# Patient Record
Sex: FEMALE | Race: BLACK OR AFRICAN AMERICAN | Employment: FULL TIME | URBAN - METROPOLITAN AREA
[De-identification: names, ages, dates, MRNs, and addresses within clinical notes are randomized per-mention and may not be internally consistent; named-entity substitution may affect disease eponyms.]

---

## 2017-02-26 ENCOUNTER — APPOINTMENT (OUTPATIENT)
Dept: GENERAL RADIOLOGY | Age: 57
End: 2017-02-26
Attending: EMERGENCY MEDICINE
Payer: COMMERCIAL

## 2017-02-26 ENCOUNTER — HOSPITAL ENCOUNTER (EMERGENCY)
Age: 57
Discharge: HOME | End: 2017-02-26
Attending: EMERGENCY MEDICINE | Admitting: EMERGENCY MEDICINE
Payer: COMMERCIAL

## 2017-02-26 VITALS
HEIGHT: 62 IN | SYSTOLIC BLOOD PRESSURE: 172 MMHG | BODY MASS INDEX: 45.19 KG/M2 | TEMPERATURE: 98.2 F | RESPIRATION RATE: 18 BRPM | OXYGEN SATURATION: 100 % | HEART RATE: 62 BPM | WEIGHT: 245.59 LBS | DIASTOLIC BLOOD PRESSURE: 92 MMHG

## 2017-02-26 DIAGNOSIS — R07.89 CHEST WALL PAIN: Primary | ICD-10-CM

## 2017-02-26 LAB
ALBUMIN SERPL BCP-MCNC: 3.6 G/DL (ref 3.5–5)
ALBUMIN/GLOB SERPL: 0.9 {RATIO} (ref 1.1–2.2)
ALP SERPL-CCNC: 80 U/L (ref 45–117)
ALT SERPL-CCNC: 83 U/L (ref 12–78)
ANION GAP BLD CALC-SCNC: 9 MMOL/L (ref 5–15)
AST SERPL W P-5'-P-CCNC: 71 U/L (ref 15–37)
BASOPHILS # BLD AUTO: 0.1 K/UL (ref 0–0.1)
BASOPHILS # BLD: 1 % (ref 0–1)
BILIRUB SERPL-MCNC: 0.8 MG/DL (ref 0.2–1)
BUN SERPL-MCNC: 11 MG/DL (ref 6–20)
BUN/CREAT SERPL: 18 (ref 12–20)
CALCIUM SERPL-MCNC: 8.8 MG/DL (ref 8.5–10.1)
CHLORIDE SERPL-SCNC: 107 MMOL/L (ref 97–108)
CK SERPL-CCNC: 72 U/L (ref 26–192)
CO2 SERPL-SCNC: 27 MMOL/L (ref 21–32)
CREAT SERPL-MCNC: 0.61 MG/DL (ref 0.55–1.02)
EOSINOPHIL # BLD: 0.2 K/UL (ref 0–0.4)
EOSINOPHIL NFR BLD: 4 % (ref 0–7)
ERYTHROCYTE [DISTWIDTH] IN BLOOD BY AUTOMATED COUNT: 13.5 % (ref 11.5–14.5)
GLOBULIN SER CALC-MCNC: 4.1 G/DL (ref 2–4)
GLUCOSE SERPL-MCNC: 83 MG/DL (ref 65–100)
HCT VFR BLD AUTO: 41.7 % (ref 35–47)
HGB BLD-MCNC: 13.8 G/DL (ref 11.5–16)
LYMPHOCYTES # BLD AUTO: 43 % (ref 12–49)
LYMPHOCYTES # BLD: 2.1 K/UL (ref 0.8–3.5)
MCH RBC QN AUTO: 30.3 PG (ref 26–34)
MCHC RBC AUTO-ENTMCNC: 33.1 G/DL (ref 30–36.5)
MCV RBC AUTO: 91.6 FL (ref 80–99)
MONOCYTES # BLD: 0.5 K/UL (ref 0–1)
MONOCYTES NFR BLD AUTO: 10 % (ref 5–13)
NEUTS SEG # BLD: 2 K/UL (ref 1.8–8)
NEUTS SEG NFR BLD AUTO: 42 % (ref 32–75)
PLATELET # BLD AUTO: 216 K/UL (ref 150–400)
POTASSIUM SERPL-SCNC: 3.3 MMOL/L (ref 3.5–5.1)
PROT SERPL-MCNC: 7.7 G/DL (ref 6.4–8.2)
RBC # BLD AUTO: 4.55 M/UL (ref 3.8–5.2)
SODIUM SERPL-SCNC: 143 MMOL/L (ref 136–145)
TROPONIN I SERPL-MCNC: <0.04 NG/ML
WBC # BLD AUTO: 4.8 K/UL (ref 3.6–11)

## 2017-02-26 PROCEDURE — 93005 ELECTROCARDIOGRAM TRACING: CPT

## 2017-02-26 PROCEDURE — 71020 XR CHEST PA LAT: CPT

## 2017-02-26 PROCEDURE — 80053 COMPREHEN METABOLIC PANEL: CPT | Performed by: EMERGENCY MEDICINE

## 2017-02-26 PROCEDURE — 85025 COMPLETE CBC W/AUTO DIFF WBC: CPT | Performed by: EMERGENCY MEDICINE

## 2017-02-26 PROCEDURE — 82550 ASSAY OF CK (CPK): CPT | Performed by: EMERGENCY MEDICINE

## 2017-02-26 PROCEDURE — 36415 COLL VENOUS BLD VENIPUNCTURE: CPT | Performed by: EMERGENCY MEDICINE

## 2017-02-26 PROCEDURE — 99283 EMERGENCY DEPT VISIT LOW MDM: CPT

## 2017-02-26 PROCEDURE — 84484 ASSAY OF TROPONIN QUANT: CPT | Performed by: EMERGENCY MEDICINE

## 2017-02-26 RX ORDER — GUAIFENESIN 100 MG/5ML
81 LIQUID (ML) ORAL DAILY
COMMUNITY

## 2017-02-26 RX ORDER — CHLORTHALIDONE 50 MG/1
50 TABLET ORAL DAILY
COMMUNITY

## 2017-02-26 RX ORDER — ESOMEPRAZOLE MAGNESIUM 40 MG/1
40 CAPSULE, DELAYED RELEASE ORAL DAILY
COMMUNITY

## 2017-02-26 RX ORDER — OXYBUTYNIN CHLORIDE 15 MG/1
30 TABLET, EXTENDED RELEASE ORAL DAILY
COMMUNITY

## 2017-02-26 RX ORDER — ATORVASTATIN CALCIUM 40 MG/1
40 TABLET, FILM COATED ORAL DAILY
COMMUNITY

## 2017-02-26 RX ORDER — ACETAMINOPHEN 500 MG
2000 TABLET ORAL 2 TIMES DAILY
COMMUNITY

## 2017-02-26 RX ORDER — AMLODIPINE BESYLATE 10 MG/1
10 TABLET ORAL DAILY
COMMUNITY

## 2017-02-26 RX ORDER — CLONIDINE HYDROCHLORIDE 0.1 MG/1
0.1 TABLET ORAL 2 TIMES DAILY
COMMUNITY

## 2017-02-26 RX ORDER — POTASSIUM CHLORIDE 20 MEQ/1
20 TABLET, EXTENDED RELEASE ORAL 2 TIMES DAILY
COMMUNITY

## 2017-02-26 RX ORDER — LOSARTAN POTASSIUM 100 MG/1
100 TABLET ORAL DAILY
COMMUNITY

## 2017-02-26 RX ORDER — METOPROLOL SUCCINATE 50 MG/1
50 TABLET, EXTENDED RELEASE ORAL DAILY
COMMUNITY

## 2017-02-27 LAB
ATRIAL RATE: 57 BPM
CALCULATED P AXIS, ECG09: 16 DEGREES
CALCULATED R AXIS, ECG10: 73 DEGREES
CALCULATED T AXIS, ECG11: 60 DEGREES
DIAGNOSIS, 93000: NORMAL
P-R INTERVAL, ECG05: 168 MS
Q-T INTERVAL, ECG07: 432 MS
QRS DURATION, ECG06: 84 MS
QTC CALCULATION (BEZET), ECG08: 420 MS
VENTRICULAR RATE, ECG03: 57 BPM

## 2017-02-27 NOTE — ED NOTES
Pt discharged with written instructions at this time. Pt verbalizes understanding and all questions were answered. Discharged by [facility], in stable condition, with . Wheelchair declined.

## 2017-02-27 NOTE — DISCHARGE INSTRUCTIONS
Musculoskeletal Chest Pain: Care Instructions  Your Care Instructions  Chest pain is not always a sign that something is wrong with your heart or that you have another serious problem. The doctor thinks your chest pain is caused by strained muscles or ligaments, inflamed chest cartilage, or another problem in your chest, rather than by your heart. You may need more tests to find the cause of your chest pain. Follow-up care is a key part of your treatment and safety. Be sure to make and go to all appointments, and call your doctor if you are having problems. Its also a good idea to know your test results and keep a list of the medicines you take. How can you care for yourself at home? · Take pain medicines exactly as directed. ¨ If the doctor gave you a prescription medicine for pain, take it as prescribed. ¨ If you are not taking a prescription pain medicine, ask your doctor if you can take an over-the-counter medicine. · Rest and protect the sore area. · Stop, change, or take a break from any activity that may be causing your pain or soreness. · Put ice or a cold pack on the sore area for 10 to 20 minutes at a time. Try to do this every 1 to 2 hours for the next 3 days (when you are awake) or until the swelling goes down. Put a thin cloth between the ice and your skin. · After 2 or 3 days, apply a heating pad set on low or a warm cloth to the area that hurts. Some doctors suggest that you go back and forth between hot and cold. · Do not wrap or tape your ribs for support. This may cause you to take smaller breaths, which could increase your risk of lung problems. · Mentholated creams such as Bengay or Icy Hot may soothe sore muscles. Follow the instructions on the package. · Follow your doctor's instructions for exercising. · Gentle stretching and massage may help you get better faster. Stretch slowly to the point just before pain begins, and hold the stretch for at least 15 to 30 seconds.  Do this 3 or 4 times a day. Stretch just after you have applied heat. · As your pain gets better, slowly return to your normal activities. Any increased pain may be a sign that you need to rest a while longer. When should you call for help? Call 911 anytime you think you may need emergency care. For example, call if:  · You have chest pain or pressure. This may occur with:  ¨ Sweating. ¨ Shortness of breath. ¨ Nausea or vomiting. ¨ Pain that spreads from the chest to the neck, jaw, or one or both shoulders or arms. ¨ Dizziness or lightheadedness. ¨ A fast or uneven pulse. After calling 911, chew 1 adult-strength aspirin. Wait for an ambulance. Do not try to drive yourself. · You have sudden chest pain and shortness of breath, or you cough up blood. Call your doctor now or seek immediate medical care if:  · You have any trouble breathing. · Your chest pain gets worse. · Your chest pain occurs consistently with exercise and is relieved by rest.  Watch closely for changes in your health, and be sure to contact your doctor if:  · Your chest pain does not get better after 1 week. Where can you learn more? Go to http://she-jennifer.info/. Enter V293 in the search box to learn more about \"Musculoskeletal Chest Pain: Care Instructions. \"  Current as of: May 27, 2016  Content Version: 11.1  © 2138-2034 [employer]. Care instructions adapted under license by [employer] (which disclaims liability or warranty for this information). If you have questions about a medical condition or this instruction, always ask your healthcare professional. [place] [address] any warranty or liability for your use of this information.

## 2017-02-27 NOTE — ED PROVIDER NOTES
HPI Comments: Rajesh Solorio is a 61 y.o. female who presents ambulatory to ED [facility] ED with CC of intermittent left sided CP x ~4 days, becoming constant since last night (2/25/17). She reports her pain intermittently radiates to her neck and back, and is exacerbated by laying on her left side. Pt reports a similar episode \"weeks ago,\" which she states resolved spontaneously. She notes family hx of cardiac disease, but denies personal hx. Pt denies trauma, injury, or heavy lifting to which her symptoms might be attributed. She specifically denies cough, sneezing, SOB, and N/V/D. She denies tobacco use. PCP: Vipul Martínez MD    There are no other complaints, changes, or physical findings at this time. The history is provided by the patient. Past Medical History:   Diagnosis Date    Arthritis     Hypertension     Ill-defined condition     bilateral LE lymphedema       Past Surgical History:   Procedure Laterality Date    HX APPENDECTOMY      HX GYN      endometrial ablasion    HX UROLOGICAL      bladder sling    JADEN BIOPSY BREAST STEREOTACTIC Right     BENIGN         History reviewed. No pertinent family history. Social History     Social History    Marital status:      Spouse name: N/A    Number of children: N/A    Years of education: N/A     Occupational History    Not on file. Social History Main Topics    Smoking status: Never Smoker    Smokeless tobacco: Not on file    Alcohol use No    Drug use: No    Sexual activity: Not on file     Other Topics Concern    Not on file     Social History Narrative         ALLERGIES: Shellfish derived; Diclofenac; Mefoxin [cefoxitin]; and Tramadol    Review of Systems   Constitutional: Negative for appetite change, chills and fever. HENT: Negative for congestion and sneezing. Eyes: Negative for visual disturbance. Respiratory: Negative for cough, shortness of breath and wheezing. Cardiovascular: Positive for chest pain (left). Negative for palpitations and leg swelling. Gastrointestinal: Negative for abdominal pain, diarrhea, nausea and vomiting. Genitourinary: Negative for dysuria, frequency and urgency. Musculoskeletal: Negative for back pain, joint swelling, myalgias and neck stiffness. Skin: Negative for rash. Neurological: Negative for dizziness, syncope, weakness and headaches. Hematological: Negative for adenopathy. Psychiatric/Behavioral: Negative for behavioral problems and dysphoric mood. Patient Vitals for the past 12 hrs:   Temp Pulse Resp BP SpO2   02/26/17 1617 98.2 °F (36.8 °C) 62 18 (!) 172/92 100 %            Physical Exam   Constitutional: She is oriented to person, place, and time. She appears well-developed and well-nourished. No distress. HENT:   Head: Normocephalic and atraumatic. Mouth/Throat: Oropharynx is clear and moist.   Eyes: Conjunctivae and EOM are normal. Pupils are equal, round, and reactive to light. No scleral icterus. Neck: Normal range of motion. Neck supple. Cardiovascular: Normal rate and regular rhythm. Exam reveals no gallop. No murmur heard. Pulmonary/Chest: Effort normal. No stridor. No respiratory distress. She has no wheezes. She has no rales. Abdominal: Soft. Bowel sounds are normal. She exhibits no distension and no mass. There is no tenderness. There is no rebound and no guarding. Musculoskeletal: Normal range of motion. She exhibits no edema. Lymphadenopathy:     She has no cervical adenopathy. Neurological: She is alert and oriented to person, place, and time. No cranial nerve deficit. Coordination normal.   Skin: Skin is warm and dry. No rash noted. No erythema. Psychiatric: She has a normal mood and affect. Nursing note and vitals reviewed.        MDM  Number of Diagnoses or Management Options  Chest wall pain:   Diagnosis management comments: DDx: chest wall pain, atypical ACS, GERD       Amount and/or Complexity of Data Reviewed  Clinical lab tests: reviewed and ordered  Tests in the radiology section of CPT®: ordered and reviewed  Tests in the medicine section of CPT®: ordered and reviewed  Review and summarize past medical records: yes  Independent visualization of images, tracings, or specimens: yes      ED Course       Procedures    EKG interpretation: (Preliminary)  Rhythm: sinus bradycardia; and regular . Rate (approx.): 57; Axis: normal; P wave: normal; QRS interval: normal ; ST/T wave: normal; Other findings: normal.    8:05 PM  Unremarkable workup. Pt's sx are most C/W chest wall pain. Will treat with NSAID, refer to PMD for follow up. Iman Vann MD    LABORATORY TESTS:  Recent Results (from the past 12 hour(s))   EKG, 12 LEAD, INITIAL    Collection Time: 02/26/17  4:19 PM   Result Value Ref Range    Ventricular Rate 57 BPM    Atrial Rate 57 BPM    P-R Interval 168 ms    QRS Duration 84 ms    Q-T Interval 432 ms    QTC Calculation (Bezet) 420 ms    Calculated P Axis 16 degrees    Calculated R Axis 73 degrees    Calculated T Axis 60 degrees    Diagnosis       Sinus bradycardia  Otherwise normal ECG  No previous ECGs available     METABOLIC PANEL, COMPREHENSIVE    Collection Time: 02/26/17  4:43 PM   Result Value Ref Range    Sodium 143 136 - 145 mmol/L    Potassium 3.3 (L) 3.5 - 5.1 mmol/L    Chloride 107 97 - 108 mmol/L    CO2 27 21 - 32 mmol/L    Anion gap 9 5 - 15 mmol/L    Glucose 83 65 - 100 mg/dL    BUN 11 6 - 20 MG/DL    Creatinine 0.61 0.55 - 1.02 MG/DL    BUN/Creatinine ratio 18 12 - 20      GFR est AA >60 >60 ml/min/1.73m2    GFR est non-AA >60 >60 ml/min/1.73m2    Calcium 8.8 8.5 - 10.1 MG/DL    Bilirubin, total 0.8 0.2 - 1.0 MG/DL    ALT (SGPT) 83 (H) 12 - 78 U/L    AST (SGOT) 71 (H) 15 - 37 U/L    Alk.  phosphatase 80 45 - 117 U/L    Protein, total 7.7 6.4 - 8.2 g/dL    Albumin 3.6 3.5 - 5.0 g/dL    Globulin 4.1 (H) 2.0 - 4.0 g/dL    A-G Ratio 0.9 (L) 1.1 - 2.2     CBC WITH AUTOMATED DIFF    Collection Time: 02/26/17  4:43 PM Result Value Ref Range    WBC 4.8 3.6 - 11.0 K/uL    RBC 4.55 3.80 - 5.20 M/uL    HGB 13.8 11.5 - 16.0 g/dL    HCT 41.7 35.0 - 47.0 %    MCV 91.6 80.0 - 99.0 FL    MCH 30.3 26.0 - 34.0 PG    MCHC 33.1 30.0 - 36.5 g/dL    RDW 13.5 11.5 - 14.5 %    PLATELET 253 733 - 289 K/uL    NEUTROPHILS 42 32 - 75 %    LYMPHOCYTES 43 12 - 49 %    MONOCYTES 10 5 - 13 %    EOSINOPHILS 4 0 - 7 %    BASOPHILS 1 0 - 1 %    ABS. NEUTROPHILS 2.0 1.8 - 8.0 K/UL    ABS. LYMPHOCYTES 2.1 0.8 - 3.5 K/UL    ABS. MONOCYTES 0.5 0.0 - 1.0 K/UL    ABS. EOSINOPHILS 0.2 0.0 - 0.4 K/UL    ABS. BASOPHILS 0.1 0.0 - 0.1 K/UL   TROPONIN I    Collection Time: 02/26/17  4:43 PM   Result Value Ref Range    Troponin-I, Qt. <0.04 <0.05 ng/mL   CK W/ REFLX CKMB    Collection Time: 02/26/17  4:43 PM   Result Value Ref Range    CK 72 26 - 192 U/L       IMAGING RESULTS:  CXR Results  (Last 48 hours)               02/26/17 1903  XR CHEST PA LAT Final result    Impression:   impression: Negative. Narrative:  Clinical indication: Chest pain. Frontal and lateral views of the chest obtained. The heart size is normal. There   is no acute infiltrate. IMPRESSION:  1. Chest wall pain        PLAN:  1. Discharge for follow up with PCP    Current Discharge Medication List      CONTINUE these medications which have NOT CHANGED    Details   potassium chloride (K-DUR, KLOR-CON) 20 mEq tablet Take 20 mEq by mouth two (2) times a day. metoprolol succinate (TOPROL-XL) 50 mg XL tablet Take 50 mg by mouth daily. chlorthalidone (HYGROTEN) 50 mg tablet Take 50 mg by mouth daily. losartan (COZAAR) 100 mg tablet Take 100 mg by mouth daily. atorvastatin (LIPITOR) 40 mg tablet Take 40 mg by mouth daily. oxybutynin chloride XL (DITROPAN XL) 15 mg CR tablet Take 30 mg by mouth daily. amLODIPine (NORVASC) 10 mg tablet Take 10 mg by mouth daily. cloNIDine HCl (CATAPRES) 0.1 mg tablet Take 0.1 mg by mouth two (2) times a day. aspirin 81 mg chewable tablet Take 81 mg by mouth daily. multivit-min-FA-lycopen-lutein (CENTRUM SILVER) 0.4-300-250 mg-mcg-mcg tab Take  by mouth. omega 3-dha-epa-fish oil (FISH OIL) 100-160-1,000 mg cap Take  by mouth. Cholecalciferol, Vitamin D3, (VITAMIN D3) 2,000 unit cap capsule Take 2,000 Units by mouth two (2) times a day. esomeprazole (NEXIUM) 40 mg capsule Take 40 mg by mouth daily. 2.   Follow-up Information     Follow up With Details Comments Contact Info    Amy Toribio MD  If symptoms worsen [address]  [phone]          Return to ED if worse     DISCHARGE NOTE:  8:19 PM  The patient is ready for discharge. The patients signs, symptoms, diagnosis, and instructions for discharge have been discussed and the pt has conveyed their understanding. The patient is to follow up as recommended with PCP or return to the ER should their symptoms worsen. Plan has been discussed and patient has conveyed their agreement. This note is prepared by Chip Logn, acting as Scribe for Vicki Toth MD.    Vicki Toth MD: The scribe's documentation has been prepared under my direction and personally reviewed by me in its entirety. I confirm that the note above accurately reflects all work, treatment, procedures, and medical decision making performed by me.

## 2017-02-27 NOTE — ED NOTES
Assumed care of pt at this time, received bedside report from [facility] with pt included in care. Pt is resting in room, with call bell in reach. All questions answered.

## 2017-04-04 ENCOUNTER — HOSPITAL ENCOUNTER (OUTPATIENT)
Dept: PHYSICAL THERAPY | Age: 57
Discharge: HOME | End: 2017-04-04
Payer: COMMERCIAL

## 2017-04-04 VITALS — HEIGHT: 62 IN | WEIGHT: 242.4 LBS | BODY MASS INDEX: 44.61 KG/M2

## 2017-04-04 PROCEDURE — 97140 MANUAL THERAPY 1/> REGIONS: CPT

## 2017-04-04 PROCEDURE — 97161 PT EVAL LOW COMPLEX 20 MIN: CPT

## 2017-04-04 NOTE — PROGRESS NOTES
[facility]  Physical Therapy Lymphedema Clinic  [address] [facility], [address] [place] [address], [facility] 22.    INITIAL EVALUATION    NAME: Jailyn Duke AGE: 61 y.o. GENDER: female  DATE: 4/4/2017  REFERRING PHYSICIAN: Dr. Brewer Beebe:   Primary Diagnosis:  · Primary lymphedema (Q82.0)  Other Treatment Diagnoses:  · Abnormality of gait (R26.9)  · Swelling not relieved by elevation (R60.9)  Date of Onset: Referred 3/2017. Patient reports swelling has been present for years since she was young, but she was first diagnosed 11/24/2015. Patient has numerous family members who present with similar swelling symptoms and this strong family history is indicative of primary lymphedema. Present Symptoms and Functional Limitations: Patient reports chronic swelling bilateral lower extremities with the right lower extremity being worse than the left. She hs received treatment at [facility] in the past with her last visit having been in June of 2016. She wears flat-knit custom Juzo compression knee highs and christ pants daily and uses full leg Circaid Juxtafits at night. Patient reports she has some issues with the Circaid Juxtafits staying up on her thighs at night and she has been having ongoing issues with the velcro rolling up on the garment which affects the compression applied. The garments wee replaced by the  as a result of this problem, but it occurred again on the replacement set. Additionally, she has had 2 episodes of increased pain in her left leg in popliteal region and proximal calf that was worse with her compression in place and improved when she removed the compression. She discontinued compression use until the pain resolved (several days) and then started wearing her products again, but the pain reoccurred and she removed the compression once again.   She has seen her physician about the pain issues and Dopplars were performed and were negative for DVT. Patient has resumed her day and night-time compression use and the pain has not reoccurred since she resumed daily compression use ( about a couple of weeks ago.)  Patient is seeking additional assistance with night compression and her home management routine. Lymphedema Life Impact Scale: Score of 28 and impairment percentage of 41. See scanned document. Past Medical History:   Past Medical History:   Diagnosis Date    Arthritis     Hypertension     Ill-defined condition     bilateral LE lymphedema     Past Surgical History:   Procedure Laterality Date    HX APPENDECTOMY      HX GYN      endometrial ablasion    HX UROLOGICAL      bladder sling    JADEN BIOPSY BREAST STEREOTACTIC Right     BENIGN     Current Medications:    Current Outpatient Prescriptions   Medication Sig    potassium chloride (K-DUR, KLOR-CON) 20 mEq tablet Take 20 mEq by mouth two (2) times a day.  metoprolol succinate (TOPROL-XL) 50 mg XL tablet Take 50 mg by mouth daily.  chlorthalidone (HYGROTEN) 50 mg tablet Take 50 mg by mouth daily.  losartan (COZAAR) 100 mg tablet Take 100 mg by mouth daily.  atorvastatin (LIPITOR) 40 mg tablet Take 40 mg by mouth daily.  oxybutynin chloride XL (DITROPAN XL) 15 mg CR tablet Take 30 mg by mouth daily.  amLODIPine (NORVASC) 10 mg tablet Take 10 mg by mouth daily.  cloNIDine HCl (CATAPRES) 0.1 mg tablet Take 0.1 mg by mouth two (2) times a day.  aspirin 81 mg chewable tablet Take 81 mg by mouth daily.  multivit-min-FA-lycopen-lutein (CENTRUM SILVER) 0.4-300-250 mg-mcg-mcg tab Take  by mouth.  omega 3-dha-epa-fish oil (FISH OIL) 100-160-1,000 mg cap Take  by mouth.  Cholecalciferol, Vitamin D3, (VITAMIN D3) 2,000 unit cap capsule Take 2,000 Units by mouth two (2) times a day.  esomeprazole (NEXIUM) 40 mg capsule Take 40 mg by mouth daily. No current facility-administered medications for this encounter. 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 She is  with 3 adult children   Living Situation: Lives with spouse     Trainable Caregiver?: Spouse, he currently assists her with compression garment use as needed   Self-care/ADLs: Independent    Mobility: Independent   Sleeping Arrangement:  Sleeps in bed, but she reports she does not sleep well at night    Adaptive Equipment Owned: Non noted by patient     Previous Therapy:  Patient has received Lymphedema treatment at [facility] lymphedema program in the past with her last appointment in June 2016. She is switching her care to [facility] secondary to it is in closer proximity to her house. Compression/Lymphedema Equipment:  Custom flat-knit Juzo compression knee highs bilateral lower extremities and christ pants. She ordered her most recent pair from [address] [place] [address] in November 2016. She also has full leg bilateral lower extremity Circaid Juxtafits for night use. She reports that Circaid replaced a set of her compression due to velcro issues, but she had the same problems occur with the second set. She also reports using body glue to assist with Juxtafit suspension on her thighs secondary to they were slipping down. She consistently performs skin care with Eucerin and lymphedema exercises regularly. SUBJECTIVE:  I really hope you can help see about finding compression for night use that will perform better. I would like to get my legs back to where they were before I had that pain problem. I really need to be able to wear my compression everyday.      Patients goals for therapy: \"To get the swelling down and keep them down. \"    OBJECTIVE DATA SUMMARY:   EXAMINATION/PRESENTATION/DECISION MAKING:   Pain:  Pain Scale 1: Numeric (0 - 10)     Pain Intensity 1: 4     Pain Location 1: Leg     Pain Orientation 1: Left, Right             Self-Care and ADLs:  Grooming: Independent Bathing: Independent   UB Dressing: Independent LB Dressing: Independent   Don/Doff Shoes/Socks: Independent Toileting: Independent       Skin and Tissue Assessment:  Dermal Status:  (x )  Intact ( )  Dry   ( )  Tenuous ( )  Flaky   ( )  Wound/lesion present ( )  Scars   ( )  Dermatitis    Texture/Consistency:  (x )  Boggy ( )  Pitting Edema   ( )  Brawny ( )  Combination   ( )  Fibrotic/Woody    Pigmentation/Color Change:  ( )  Normal ( )  Hemosiderin   ( )  Red ( )  Erythematous   (x )  Hyperpigmented- mild bilateral lower legs, patient reports this has improved significantly since treatment was initiated ( )  Hyperlipodermatosclerosis   Anomalies:  ( )  Lymphorrhea ( )  Vesicles   ( )  Petechiae ( )  Warty Vercusis   ( )  Bullae ( )  Papilloma   Circulatory:  ( )  GALEN ( )  Varicosities: ( X)  Pulses Dorsalis Pedis is palpable bilaterally ( x)  Vascular studies ruled out DVT in past 2/2017 and  12/1/2015, negative for underlying venous reflux   ( )  DVT History    Nails:  (x )  Normal  ( )  Fungus  Stemmers Sign: Positive bilaterally  Height:  Height: 5' 2\" (157.5 cm)  Weight:  Weight: 110 kg (242 lb 6.4 oz)   BMI:  BMI (calculated): 44.3  (36 or greater: adversely affecting lymphedema)  Patient's weight has decreased since last June  Wound/Ulcer:  N/A      Wound Pain:   N/A  Volumetric Measurements:   Right:  15,429.93 mL Left:  14,777.79 mL   % Difference: 4.41%    (See scanned graph)  Patient's right lower extremity was 13,819.81 ml and left lower extremity was 12,738.88 ml on 5/18/16.   This is an increase in swelling secondary to her weight has actually decreased since she was last treated in lymphedema therapy  Range of Motion: Grossly within functional limits bilaterally  Strength: Grossly within functional limits bilateral lower extremities as observed in clinic today  Sensation:  Intact    Mobility:   Bed/Chair Mobility:  Independent Transfers:  Independent   Sitting Balance:  Good Standing Balance: Good   Gait:  Independent without assistive device with wide base of support Wheelchair Mobility:  N/A   Endurance:  Good Stairs:  Not assessed       Safety:  Patient is alert and oriented:  Yes   Safety awareness:  Good   Fall Risk?:  Low risk, she does not report a history of falls   Patient given written fall prevention handout: Yes   Precautions:  Standard lymphedema precautions to include avoiding blood pressure readings, injections and IVs or other procedures/acts that could lead to broken skin on affected area, and avoiding excessive heat, resistive activity or altitude without compression garment     Based on the above components, the patient evaluation is determined to be of the following complexity level: LOW     Evaluation Time: 6941-7843  60 minutes    TREATMENT PROVIDED:   1. Treatment description:  Manual Therapy: Patient instructed in skin care principles and anatomy of the lymphatic system. Patient is performing skin care daily with Eucerin lotion. Reviewed in detail the signs and symptoms of cellulitis and how to respond to these signs by contacting her doctor should they occur. She verbalized understanding. Discussed compression garment options for night use as this seems to be where the biggest concern remains with regard to her self management compression garment options. Demonstrated foam based night compression garments with samples of Seth, Solaris, Tribute and Vance sleeves. Demonstrated product donning and doffing for patient.   Discussed that other options are available in the velcro style compression as patient's Circaid Juxtafits are not wearing well for patient and she is having issues with the wear of the velcro. Provided patient with information on the Flexitouch and Flexitouch ordering process and discussed how it can be used in the home to assist with self-management. Patient expressed interest in pursuing this product. Treatment time:  7188-2068 Minutes: 30    ASSESSMENT:   Dasha Schwartz is a 61 y.o. female who presents with bilateral lower extremity primary lymphedema. Patient has been treated at [facility] in the past and has compression products for home use including flat-knit Juzo knee highs and christ pants which fit well and currently do not need to be replaced. She has had some exacerbation of her swelling recently and reports some unexplained episodes of left leg pain that worsened with compression in place and resulted in her having to remove the compression. Dopplers were completed to rule out DVT. Additionally she is having some issues with her night compression products and may benefit form some different compression products from those she is currently using. She would also benefit from the addition of an advanced pneumatic compression device to her home program.   Her condition is complicated by arthritis and HTN. Patient will benefit from complete decongestive therapy (CDT) including manual lymphatic drainage (MLD) technique, short-stretch textile bandages/compression system to decongest limb, and kinesiotaping as appropriate. Patient will receive instruction in proper skin care to recognize signs/symptoms of and prevent infection, therapeutic exercise, and self-MLD for independent home program and restorative lymphatic performance. This care is medically necessary due to the infection risk with lymphedema and to improve functional activities. CDT is necessary to resolve swelling to allow patient to return to wearing normal clothes/footwear and prevent worsening of symptoms, such as venous stasis ulcerations, infections, or hospitalizations.   Patient will be independent with home program strategies to allow improved ADL ability and mobility and to allow patient to return to greatest functional independence. Rehabilitation potential is considered to be Good. Factors which may influence rehabilitation potential include leg pain. Patient will benefit from 8-10 physical therapy visits over 10-12 weeks to optimize improvement in these areas. PLAN OF CARE:   Recommendations and Planned Interventions:  Manual lymph drainage/complete decongestive therapy  Multi-layer compression bandaging (short-stretch)  Compression garment fitting/provision  Lymphedema therapeutic exercise  AROM/PROM/Strength/Coordination  Self-care training  Functional mobility training  Education in skin care and lymphedema precautions  Self-MLD education per home program  Self-bandaging education per home program  Caregiver education as needed  Wound care as needed     GOALS  Short term goals  Time frame: 5/3/17  1. Patient will demonstrate knowledge of signs/symptoms of infections/cellulitis and be independent in skin care to prevent cellulitis. 2.  Patient will demonstrate independence in lymphedema home program of therapeutic exercises to improve circulation and decongest limb to improve ADLs. 3.  Patient will be measured for new night compression garments to improve her night time self-management routine and promote optimal limb decongestion. 4. Patient will complete a Flexitouch trial and she will be educated in this product as a possible additional self-management tool. Long term goals  Time frame: 6/30/17  1. Pt will show improvement in Lymphedema Life Impact Scale by decreasing impairment percentage to under 35 and thus allow improvement in patient's quality of life. 2.  Patient will be independent with don/doff of compression system and use in order to prevent reaccumulation of fluid at discharge.   3.  Pt will be independent in self-MLD and show stable limb volumes showing decongestion and pt. ready for transition to independent restorative phase of lymphedema therapy. Patient has participated in goal setting and agrees to work toward plan of care. Patient was instructed to call if questions or concerns arise. Thank you for this referral.   CHANCE Espinosa, MOMO   Time Calculation: 85 mins    TREATMENT PLAN EFFECTIVE DATES:   4/4/2017 TO 6/30/17  I have read the above plan of care for Jailyn Duke. I certify the above prescribed services are required by this patient and are medically necessary.   The above plan of care has been developed in conjunction with the lymphedema/physical therapist.       Physician Signature: ____________________________________Date:______________

## 2017-04-13 ENCOUNTER — HOSPITAL ENCOUNTER (OUTPATIENT)
Dept: PHYSICAL THERAPY | Age: 57
Discharge: HOME | End: 2017-04-13
Payer: COMMERCIAL

## 2017-04-13 PROCEDURE — 97140 MANUAL THERAPY 1/> REGIONS: CPT

## 2017-04-13 NOTE — PROGRESS NOTES
[facility]  Physical Therapy Lymphedema Clinic  [address] [facility], [address], [facility] 22.    LYmphedema Therapy  Visit: 2    [x]                  Daily note               []                 30 day/10th visit progress note    NAME: Dominique Aguila  DATE: 4/13/2017    GOALS       Short term goals  Time frame: 5/3/17  1. Patient will demonstrate knowledge of signs/symptoms of infections/cellulitis and be independent in skin care to prevent cellulitis. Reviewed the signs and symptoms of cellulitis with patient today. 2.  Patient will demonstrate independence in lymphedema home program of therapeutic exercises to improve circulation and decongest limb to improve ADLs. 3.  Patient will be measured for new night compression garments to improve her night time self-management routine and promote optimal limb decongestion. Goal met 4/13/17. Measured for Circaid Juxtafit lower legging and upper legging with knee attached. 4. Patient will complete a Flexitouch trial and she will be educated in this product as a possible additional self-management tool. [facility] has been contacted about Flexitouch demo request.  We are awaiting follow-up from them at this time. Long term goals  Time frame: 6/30/17  1. Pt will show improvement in Lymphedema Life Impact Scale by decreasing impairment percentage to under 35 and thus allow improvement in patient's quality of life. 2.  Patient will be independent with don/doff of compression system and use in order to prevent reaccumulation of fluid at discharge. 3.  Pt will be independent in self-MLD and show stable limb volumes showing decongestion and pt. ready for transition to independent restorative phase of lymphedema therapy. SUBJECTIVE REPORT:  I still have some soreness on my left leg to touch, but I have not had that pain like I had there before.   I have been able to wear my compression stockings since I last saw you without having that pain again. After seeing the other night garments, I think I may want to stick with what I have and get new ones. They still may be the best option for me. Pain:  Pain Scale 1: Numeric (0 - 10)     Pain Intensity 1: 3     Pain Location 1: Leg     Pain Orientation 1: Left, Right           Gait:  Patient ambulates independent without assistive device. ADLs:  Independent with ADLs. Treatment Response:  Patient dons and doffs her compression garments independent and arrived wearing custom flat-knit stockings today. She continues to use her Circaid Juxtafits nightly but is having to secure the thigh piece velcro with tape to keep the garment closed. Reviewed packet received at evaluation. Function: Patient works full time as a . .  TREATMENT AND OBJECTIVE DATA SUMMARY:   Patient/Family Education:      Educated in skin care: Demonstrated skin care principles previously. Reviewed signs and symptoms of cellulitis infection with patient . Educated in exercise:   Deferred today for compression garment measuring     Instructed in self MLD:  Deferred for compression garment measuring. Instructed in don/doff of compression system: Educated patient in various compression garment options for night wear including Seth, Clearance Reap, Vance sleeve, and Optiflow RM. Discussed garment donning and doffing, adjustability, lifespan and pricing. Patient opted to reorder the Circaid Juxtafits and therapist proceeded with garment measuring today. Demonstrated foot options and discussed the benefits of each with her with patient requesting a price quote on the closed heel ankle foot piece. Therapeutic Activity 0 minutes   Treatment time: N/A  Functional Mobility: Independent ambulation and transfers   Fall risk: No     Therapeutic Exercise/Procedure 0 minutes   Treatment time: N/A  Coni ball exercise program: Demonstration by therapist of written routine.   Patient performed each x5 reps. Stick exercise program: N/A   Free exercises/ROM: N/A   Home program: Patient to perform daily to BID skin care, deep abdominal breathing, exercise routine. Rationale: Exercise will increase the lymph angiomotoricity and tissue pressure of the skin and thus decrease swelling. Modalities 0 minutes   Treatment time: N/A  Vasopneumatic pump: Patient information has been sent to [facility] for Review and possible Flexitouch Demo in the near future. Manual Lymphatic Drainage (MLD) 95 minutes   Treatment time: 6652-3599  Area to decongest: Bilateral lower extremities   Sequence used and effectiveness: Secondary sequences for bilateral lower extremities   Skin/wound care/debridement: Skin 100% intact today. Upper/Lower extremity compression: Educated patient in various compression garment options for night wear including Seth, Tributes, Vance sleeve, and Optiflow RM and demonstrated product samples for her. Discussed garment donning and doffing, adjustability, lifespan and pricing. Patient opted to reorder the Circaid Juxtafits and therapist proceeded with garment measuring today. Measured for Custom Circaid Juxtafit premiums for lower leg and an upper leg with knee attachment piece. Demonstrated foot options and discussed the benefits of each with her with patient requesting a price quote on the closed heel ankle foot piece. Donned a small closed heel garment on patient's right foot for assessment and it was slightly small on patient. She would benefit from a larger size. Kinesiotaping: N/A   Girth/Volume measurement: Deferred as measurements were taken for bilateral lower extremity Circaid Juxtafit full leg night velcro style compression garments. TOTAL TREATMENT 95 mins     Circumferential Limb Measurements: Bilateral lower extremities affected. Measured for compression garments today.     ASSESSMENT:   Treatment effectiveness and tolerance: Patient was requesting to try new compression garments for her night wear due to some issues with her current Juxtafits and the velcro wear. She was shown alternative foam style night compression and Solaris Ready Wraps and opted to be remeasured for new Circaid Juxtafit full leg(separate lower leg and knee/thigh garments.)  Will assess fit upon delivery. If her velcro issues reoccur, therapist will contact [employer] representative to discuss the problem with her. Her current velcro garments are at the end of their lifespan and the company would not be obligated to address those issues with the current products. Progress toward goals: See goals above. Short term goal 3 met. PLAN OF CARE:   Changes to the plan of care: Continue with plan of care.    Frequency: 1 visit every 1-2 weeks   Other: Order Juxtafits with [address], [facility], [id number]

## 2017-04-18 ENCOUNTER — HOSPITAL ENCOUNTER (OUTPATIENT)
Dept: PHYSICAL THERAPY | Age: 57
Discharge: HOME | End: 2017-04-18
Payer: COMMERCIAL

## 2017-04-18 PROCEDURE — 97016 VASOPNEUMATIC DEVICE THERAPY: CPT

## 2017-04-18 PROCEDURE — 97140 MANUAL THERAPY 1/> REGIONS: CPT

## 2017-04-18 NOTE — PROGRESS NOTES
[facility]  Physical Therapy Lymphedema Clinic  [address] [facility], [address] [place] [address] 22.    LYmphedema Therapy  Visit: 3    [x]                  Daily note               []                 30 day/10th visit progress note    NAME: Enriqueta Cassidy  DATE: 4/18/2017    GOALS       Short term goals  Time frame: 5/3/17  1. Patient will demonstrate knowledge of signs/symptoms of infections/cellulitis and be independent in skin care to prevent cellulitis. Reviewed the signs and symptoms of cellulitis with patient. 2.  Patient will demonstrate independence in lymphedema home program of therapeutic exercises to improve circulation and decongest limb to improve ADLs. Educated patient in deep abdominal breathing. 3.  Patient will be measured for new night compression garments to improve her night time self-management routine and promote optimal limb decongestion. Goal met 4/13/17. Measured for Circaid Juxtafit lower legging and upper legging with knee attached. 4. Patient will complete a Flexitouch trial and she will be educated in this product as a possible additional self-management tool. [facility] has been contacted about [address] request.   Therapist completed Flexitouch treatment in the clinic today and information will be sent to [facility] for potential ordering if patient qualifies. .    Long term goals  Time frame: 6/30/17  1. Pt will show improvement in Lymphedema Life Impact Scale by decreasing impairment percentage to under 35 and thus allow improvement in patient's quality of life. 2.  Patient will be independent with don/doff of compression system and use in order to prevent reaccumulation of fluid at discharge. 3.  Pt will be independent in self-MLD and show stable limb volumes showing decongestion and pt. ready for transition to independent restorative phase of lymphedema therapy.   Initiated education in self manual lymph drainage to the trunk today. Further education is needed. Provided her with a handout for home use. SUBJECTIVE REPORT:  I am really concerned that my sisters legs are getting worse. They have not done any treatment for their legs. I don't want my legs to get bigger. I want to keep it under control. Pain:  Pain Scale 1: Numeric (0 - 10)     Pain Intensity 1: 3     Pain Location 1: Leg     Pain Orientation 1: Left, Right           Gait:  Patient ambulates independent without assistive device. ADLs:  Independent with ADLs. Treatment Response:  Patient dons and doffs her compression garments independent and arrived wearing custom flat-knit stockings today. She continues to use her Circaid Juxtafits nightly but is having to secure the thigh piece velcro with tape to keep the garment closed. Function: Patient works full time as a . .  TREATMENT AND OBJECTIVE DATA SUMMARY:   Patient/Family Education:      Educated in skin care: Demonstrated skin care principles previously. Reviewed signs and symptoms of cellulitis infection with patient . Educated in exercise:  Educated patient in deep abdominal breathing today. Instructed in self MLD:  Initiated education in self manual lymph drainage today with demonstration by therapist followed by patient re-demonstration. Provided patient with handout of techniques and reviewed with her. Instructed in don/doff of compression system: Previously educated patient in various compression garment options for night wear including Seth, Alicia Bert, Vance sleeve, and Optiflow RM. Discussed garment donning and doffing, adjustability, lifespan and pricing. Patient opted to reorder the Circaid Juxtafits and therapist proceeded with garment measuring today. Demonstrated foot options and discussed the benefits of each with her with patient requesting a price quote on the closed heel ankle foot piece.     Updated patient on compression garment order which has been sent to vendor, [facility].       Therapeutic Activity 0 minutes   Treatment time: N/A  Functional Mobility: Independent ambulation and transfers   Fall risk: No     Therapeutic Exercise/Procedure 0 minutes   Treatment time: N/A  Coni ball exercise program:  Deferred for Flexitouch demonstration today. Stick exercise program: N/A   Free exercises/ROM: Educated patient in deep abdominal breathing today. Home program: Patient to perform daily to BID skin care, deep abdominal breathing, exercise routine, daily compression garment wearing schedule, and the addition of trunk self  manual lymph drainage today. Rationale: Exercise will increase the lymph angiomotoricity and tissue pressure of the skin and thus decrease swelling. Modalities 75 minutes   Treatment time: 6260-8518  Vasopneumatic pump:  Performed an advanced vasopneumatic demonstration with patient today with the Flexitouch. Pre and post treatment measurements were taken on the right lower extremity and are documented below. Assessed sizing measurements. Educated patient in product donning and doffing, product role, daily use with alternating limbs, product ordering process, and training. Patient had a positive response to Flexitouch with decreased measurements noted post treatment and no adverse reactions were noted. Patient would benefit from the addition of a Flexitouch to her home management routine. Manual Lymphatic Drainage (MLD) 25 minutes   Treatment time: 6693-6998  Area to decongest: Bilateral lower extremities   Sequence used and effectiveness: Secondary sequences for bilateral lower extremities   Educated patient in self manual lymph drainage techniques to trunk with cervical and supraclavicular techniques, shoulder techniques, bilateral axillary lymph nodes, bilateral inguinal axillary anastamoses. Educated patient in deep breathing with 10 repetitions.   Reviewed handout for home self manual lymph drainage and provided patient with handout to take with her today. Skin/wound care/debridement: Skin 100% intact today. Upper/Lower extremity compression:  Patient donned her custom Juzo flat knee highs and christ pants post treatment today. She arrived to therapy wearing her garments today. Kinesiotaping: N/A   Girth/Volume measurement: Measured girths today and assessed right lower extremity pre and post Flexitouch demo. TOTAL TREATMENT 100 mins     Circumferential Limb Measurements: Bilateral lower extremities affected. Waist measures 111.5cm and hip measures 133.5cm      Left (cm) Right (cm) Pre/Post Flexi demo   Ankle     28.5  29.8/29.2      Calf     48.2  49.8/48.5      Mid Knee       63.5/63.8      Thigh     73.2  74.9/73.3           inseam       70.0          ASSESSMENT:   Treatment effectiveness and tolerance:  Compression garment order has been placed for patient. Performed Flexitouch demonstration today with patient education in Flexitouch use. Pre and post Flexitouch measurements were taken and patient presents with significant measurement decreases post Flexitouch use. She would greatly benefit from the addition of a Flexitouch pneumatic device to her home management routine as she presents with trunk and bilateral lower extremity swelling and she is at risk of it worsening over time. She has an extensive family history with multiple sisters with lymphedema who all present with swelling that is worse than hers. She is dedicated to a self management routine to control her swelling and prevent it from worsening. Initiated education in manual lymph drainage techniques today. Progress toward goals: See goals above. Short term goal 3 met. PLAN OF CARE:   Changes to the plan of care: Continue with plan of care.    Frequency: 1 visit every 1-2 weeks   Other:  Continue to pursue Flexitouch request        CHANCE Lr, MOMO

## 2017-04-27 ENCOUNTER — HOSPITAL ENCOUNTER (OUTPATIENT)
Dept: PHYSICAL THERAPY | Age: 57
Discharge: HOME | End: 2017-04-27
Payer: COMMERCIAL

## 2017-04-27 PROCEDURE — 97110 THERAPEUTIC EXERCISES: CPT

## 2017-04-27 PROCEDURE — 97140 MANUAL THERAPY 1/> REGIONS: CPT

## 2017-04-27 NOTE — PROGRESS NOTES
[address] [place] [address]  Physical Therapy Lymphedema Clinic  [address], [address] [place] [address], [facility] 22.    LYmphedema Therapy  Visit: 4    [x]                  Daily note               []                 30 day/10th visit progress note    NAME: Zeynep Dickens  DATE: 4/27/2017    GOALS       Short term goals  Time frame: 5/3/17  1. Patient will demonstrate knowledge of signs/symptoms of infections/cellulitis and be independent in skin care to prevent cellulitis. Goal met 4-.  2.  Patient will demonstrate independence in lymphedema home program of therapeutic exercises to improve circulation and decongest limb to improve ADLs. Educated patient in deep abdominal breathing ROM and Coni ball exercises. Patient appears to have a good understanding. Goal met 4-.  3.  Patient will be measured for new night compression garments to improve her night time self-management routine and promote optimal limb decongestion. Goal met 4/13/17. Received   Circaid Juxtafit lower legging and upper legging with knee attached and it appears to be a good fit. 4. Patient will complete a Flexitouch trial and she will be educated in this product as a possible additional self-management tool. [facility] has been contacted about [address] [facility] request.   Therapist completed Flexitouch treatment in the clinic  and information will be sent to [facility] for potential ordering if patient qualifies. Progressing with goal.    Long term goals  Time frame: 6/30/17  1. Pt will show improvement in Lymphedema Life Impact Scale by decreasing impairment percentage to under 35 and thus allow improvement in patient's quality of life. 2.  Patient will be independent with don/doff of compression system and use in order to prevent reaccumulation of fluid at discharge.   3.  Pt will be independent in self-MLD and show stable limb volumes showing decongestion and pt. ready for transition to independent restorative phase of lymphedema therapy. SUBJECTIVE REPORT:  Patient voiced no complaints. Happy that she has received new compression garments. Pain:  Pain Scale 1: Numeric (0 - 10)     Pain Intensity 1: 0                       Gait:  Patient ambulates independent without assistive device. ADLs:  Independent with ADLs. Treatment Response:  Patient dons and doffs her compression garments independent and arrived wearing custom flat-knit stockings today. She did bring in new Circaid Juxtafits today. Function: Patient works full time as a . .  TREATMENT AND OBJECTIVE DATA SUMMARY:   Patient/Family Education:      Educated in skin care: Demonstrated skin care principles. Patient appears to have a good understanding. Educated in exercise:  Educated patient in deep abdominal breathing , ROM and Coni ball exercises. Instructed in self MLD: Continued education in self manual lymph drainage today with demonstration by therapist followed by patient re-demonstration. Provided patient with handout of techniques and reviewed with her. Instructed in don/doff of compression system: Patient able to demonstrate independence with donning and doffing lower leg, thigh with knee and foot piece  Ciraid Juxtafits. Therapeutic Activity 0 minutes   Treatment time: N/A  Functional Mobility: Independent ambulation and transfers   Fall risk: No     Therapeutic Exercise/Procedure 40 minutes   Treatment time: 1:50-2:30  Coni ball exercise program: Performed exercise routine in supine and sitting 5 reps each . Free exercises/ROM: Educated patient in deep abdominal breathing and ROM routine 5 reps each. Also trunk flexion and extension with doffing and donning compression garments. Home program: Patient to perform daily to BID skin care, deep abdominal breathing, exercise routine, daily compression garment wearing schedule, and the  self  manual lymph drainage .    Rationale: Exercise will increase the lymph angiomotoricity and tissue pressure of the skin and thus decrease swelling. Modalities minutes   Treatment time:   Vasopneumatic pump: Information send to [facility] to try and secure a Flexitouch for home use. Manual Lymphatic Drainage (MLD)  30 minutes   Treatment time: 1:20-1:50  Area to decongest: Bilateral lower extremities   Sequence used and effectiveness: Secondary sequences for bilateral lower extremities . Patient demonstrated full sequence of self MLD with verbal cues and demonstration from therapist. Given written handout. Skin/wound care/debridement: Skin 100% intact. Irina Line Upper/Lower extremity compression: Patient received new Ciraid juxtafits for night compression. They  appear to be a good fit. Patient wearing carpi pants and custom knee high stockings during the day. Kinesiotaping: N/A   Girth/Volume measurement: Deferred today will perform after patient has had a chance to wear new garments. TOTAL TREATMENT 70 mins     Circumferential Limb Measurements:     ASSESSMENT:   Treatment effectiveness and tolerance:     Progress toward goals: See goals above. Short term goal 1 and 2 met today. PLAN OF CARE:   Changes to the plan of care: Continue with plan of care. Frequency: Will check in a month after patient has had a chance to wear new garments. Other:  Continue to pursue Flexitouch request       Wilfred Ni.

## 2017-05-25 ENCOUNTER — HOSPITAL ENCOUNTER (OUTPATIENT)
Dept: PHYSICAL THERAPY | Age: 57
Discharge: HOME | End: 2017-05-25
Payer: COMMERCIAL

## 2017-05-25 PROCEDURE — 97110 THERAPEUTIC EXERCISES: CPT

## 2017-05-25 PROCEDURE — 97140 MANUAL THERAPY 1/> REGIONS: CPT

## 2017-05-25 NOTE — PROGRESS NOTES
[facility]  Physical Therapy Lymphedema Clinic  [address] [facility], [address] 22.    LYmphedema Therapy  Visit: 5    []                  Daily note               [x]                 Discharge summary with discharge to 6 month home restorative program    NAME: Hilary Ambrose  DATE: 5/25/2017    GOALS       Short term goals  Time frame: 5/3/17  1. Patient will demonstrate knowledge of signs/symptoms of infections/cellulitis and be independent in skin care to prevent cellulitis. Goal met 4-.  2.  Patient will demonstrate independence in lymphedema home program of therapeutic exercises to improve circulation and decongest limb to improve ADLs. Educated patient in deep abdominal breathing ROM and Coni ball exercises. Patient is independent with exercises. Goal met 4-.  3.  Patient will be measured for new night compression garments to improve her night time self-management routine and promote optimal limb decongestion. Goal met 4/13/17. Received   Circaid Juxtafit lower legging and upper legging with knee attached and with good fit noted. 4. Patient will complete a Flexitouch trial and she will be educated in this product as a possible additional self-management tool. Patient has received the Flexitouch for home use and is currently waiting to be trained in use of product. Training will be completed by the vendor [employer].  Goal met 5-    Long term goals  Time frame: 6/30/17  1. Pt will show improvement in Lymphedema Life Impact Scale by decreasing impairment percentage to under 35 and thus allow improvement in patient's quality of life. Lymphedema Life Impact Scale show a score of 18 with a 26% impairment. Goal met 5-.  2.  Patient will be independent with don/doff of compression system and use in order to prevent reaccumulation of fluid at discharge. Patient is able to don and doff all compression garments. Volumetric measurements show a decrease of 241 mls in the left leg and a decrease of 489 mls in the right leg since evaluation. Goal met 5-. 3.  Pt will be independent in self-MLD and show stable limb volumes showing decongestion and pt. ready for transition to independent restorative phase of lymphedema therapy. Patient is independent with home program and her limb volumes have decreased bilaterally as noted above. Goal met 5-. SUBJECTIVE REPORT:  Patient voiced no complaints. Pain:  Pain Scale 1: Numeric (0 - 10)     Pain Intensity 1: 0                       Gait:  Patient ambulates independent without assistive device. ADLs:  Independent with ADLs. Treatment Response:  Patient progressing well with therapy. Wearing compression knee high stockings and compression capris pants during the daytime and Circaid Juxtafits at night. Function: Patient works full time as a . .  TREATMENT AND OBJECTIVE DATA SUMMARY:   Patient/Family Education:      Educated in skin care: Demonstrated skin care principles. Patient is independent. Educated in exercise:  Educated patient in deep abdominal breathing , ROM and Coni ball exercises. Instructed in self MLD: Patient able to perform following written handout. Instructed in don/doff of compression system: Patient able to demonstrate independence with donning and doffing Circaid Juxtfait lower legging, thigh with knee attached, and foot piece, knee high stockings and capris. Therapeutic Activity 0 minutes   Treatment time: N/A  Functional Mobility: Independent ambulation and transfers   Fall risk: No     Therapeutic Exercise/Procedure 25 minutes   Treatment time: 2:20-2:45  Coni ball exercise program: Performed exercise routine in supine and sitting 5 reps each . Free exercises/ROM: Patient performed deep abdominal breathing and ROM routine 5 reps each.  Also trunk flexion with doffing and donning compression garments. Home program: Patient to perform daily to BID skin care, deep abdominal breathing, exercise routine, daily compression garment wearing schedule, and   self  manual lymph drainage . Rationale: Exercise will increase the lymph angiomotoricity and tissue pressure of the skin and thus decrease swelling. Modalities minutes   Treatment time:   Vasopneumatic pump: Patient has received the Flexitouch and is waiting to be trained by [employer] representatives. .     Manual Lymphatic Drainage (MLD)  30 minutes   Treatment time: 1:50-2:20  Area to decongest: Bilateral lower extremities   Sequence used and effectiveness: Reviewed self manual lymph drainage techniques with patient. Patient given written handout and she redemonstrated techniques independent. Skin/wound care/debridement: Skin 100% intact. Shelvy Setting Upper/Lower extremity compression: Patient wearing full leg Ciraid juxtafits for night compression. Patient wearing custom flat-knit capris length compression pants and custom flat-knit knee high stockings during the day. Kinesiotaping: N/A   Girth/Volume measurement: Volumetric measurement today reveal a decrease of 241mls in the left leg and a decrease of 489 mls in the right leg since the evaluation. TOTAL TREATMENT 55 mins     Circumferential Limb Measurements:   Volume measurements taken today. See scanned documents. Right lower extremity limb volume is 14,9540.61 ml versus 15,429.93 ml at evaluation and left lower extremity limb volume is 14,536.25ml versus 14,777.79 ml. ASSESSMENT:   Treatment effectiveness and tolerance:  Patient is independent with all self lymphedema management techniques and she is wearing compression garments about 22 hours per day. Her velcro style night compression garments fit well and she is independent with product use. She received a Flexitouch for home use and will trained by [employer].  All goals are met at this time.    Progress toward goals: See goals above. Short term goals 1,2,3, and 4 and long term goals 1,2,and 3 were met. PLAN OF CARE:   Changes to the plan of care: Discharge to 6 month home restorative program.   Frequency: Re-evaluate patient in 6 months for swelling status and home compression needs. Other:  Patient has received Flexitouch and is will receive training in product use through [employer].       Arlen JOHNS. CLT.      REMY KingT, MOMO

## 2017-10-16 ENCOUNTER — HOSPITAL ENCOUNTER (OUTPATIENT)
Dept: MAMMOGRAPHY | Age: 57
Discharge: HOME | End: 2017-10-16
Attending: OBSTETRICS & GYNECOLOGY
Payer: COMMERCIAL

## 2017-10-16 DIAGNOSIS — Z12.31 VISIT FOR SCREENING MAMMOGRAM: ICD-10-CM

## 2017-10-16 PROCEDURE — 77067 SCR MAMMO BI INCL CAD: CPT

## 2017-11-16 ENCOUNTER — HOSPITAL ENCOUNTER (OUTPATIENT)
Dept: PHYSICAL THERAPY | Age: 57
Discharge: HOME | End: 2017-11-16
Payer: COMMERCIAL

## 2017-11-16 VITALS — WEIGHT: 241 LBS | HEIGHT: 62 IN | BODY MASS INDEX: 44.35 KG/M2

## 2017-11-16 PROCEDURE — 97140 MANUAL THERAPY 1/> REGIONS: CPT

## 2017-11-16 PROCEDURE — 97161 PT EVAL LOW COMPLEX 20 MIN: CPT

## 2017-11-16 NOTE — PROGRESS NOTES
[facility]  Physical Therapy Lymphedema Clinic  [address] [facility], [address] [place] [address], [facility] 22.    INITIAL EVALUATION    NAME: Lia Myers AGE: 61 y.o. GENDER: female  DATE: 11/16/2017  REFERRING PHYSICIAN: Dr. John Burgos:   Primary Diagnosis:  · Primary lymphedema (Q82.0)  Other Treatment Diagnoses:  · Abnormality of gait (R26.9)  · Swelling not relieved by elevation (R60.9)  Date of Onset: Referred 3/2017. Patient reports swelling has been present for years since she was young, but she was first diagnosed 11/24/2015. Patient has numerous family members who present with similar swelling symptoms and this strong family history is indicative of primary lymphedema. Patient returns for 6 month re-evaluation today. Present Symptoms and Functional Limitations:  Patient reports no significant problems with her swelling since she was last seen in the spring of 2017. She reports her current daytime compression stockings do not feel as tight as they should. She is wearing her Juxtafits nightly and reports they stay up a lot better than the ones she used in the past.  She likes that she can adjust them. She received her Flexitouch and is using it at home daily. Patient reports a history of chronic swelling bilateral lower extremities with the right lower extremity being worse than the left. Lymphedema Life Impact Scale: Score of 28 and impairment percentage of 41. See scanned document.   Past Medical History:   Past Medical History:   Diagnosis Date    Arthritis     Hypertension     Ill-defined condition     bilateral LE lymphedema     Past Surgical History:   Procedure Laterality Date    HX APPENDECTOMY      HX GYN      endometrial ablasion    HX UROLOGICAL      bladder sling    JADEN BIOPSY BREAST STEREOTACTIC Right     BENIGN     Current Medications:    Current Outpatient Prescriptions   Medication Sig    potassium chloride (K-DUR, KLOR-CON) 20 mEq tablet Take 20 mEq by mouth two (2) times a day.  metoprolol succinate (TOPROL-XL) 50 mg XL tablet Take 50 mg by mouth daily.  chlorthalidone (HYGROTEN) 50 mg tablet Take 50 mg by mouth daily.  losartan (COZAAR) 100 mg tablet Take 100 mg by mouth daily.  atorvastatin (LIPITOR) 40 mg tablet Take 40 mg by mouth daily.  oxybutynin chloride XL (DITROPAN XL) 15 mg CR tablet Take 30 mg by mouth daily.  amLODIPine (NORVASC) 10 mg tablet Take 10 mg by mouth daily.  cloNIDine HCl (CATAPRES) 0.1 mg tablet Take 0.1 mg by mouth two (2) times a day.  aspirin 81 mg chewable tablet Take 81 mg by mouth daily.  multivit-min-FA-lycopen-lutein (CENTRUM SILVER) 0.4-300-250 mg-mcg-mcg tab Take  by mouth.  omega 3-dha-epa-fish oil (FISH OIL) 100-160-1,000 mg cap Take  by mouth.  Cholecalciferol, Vitamin D3, (VITAMIN D3) 2,000 unit cap capsule Take 2,000 Units by mouth two (2) times a day.  esomeprazole (NEXIUM) 40 mg capsule Take 40 mg by mouth daily. No current facility-administered medications for this encounter. 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   She is  with 3 adult children   Living Situation: Lives with spouse     Trainable Caregiver?: Spouse, he currently assists her with compression garment use as needed   Self-care/ADLs: Independent    Mobility: Independent   Sleeping Arrangement:  Sleeps in bed, but she reports she does not sleep well at night    Adaptive Equipment Owned: None noted by patient     Previous Therapy:  Patient has received Lymphedema treatment at [facility] lymphedema program in the past with her last appointment in June 2016. She was seen at [facility] Lymphedema program 4/4/2017 - 5/2017  Compression/Lymphedema Equipment:  Custom flat-knit Juzo compression knee highs bilateral lower extremities and christ pants. She also has full leg bilateral lower extremity Circaid Juxtafits for night use. She also reports using body glue to assist with Juxtafit suspension on her thighs secondary to keep them form slipping down. She consistently performs skin care with Eucerin and lymphedema exercises regularly. She has a Flexitouch which she uses daily. SUBJECTIVE:  They called me to say I could order another set of stockings, but I wanted to wait until I saw you. They ones I have feel like they are not as tight as they should. I think I am doing well. I am using the Flexitouch everyday and I always wear my garments. I can feel the velcro on the Juxtafits starting to not  as well. Patients goals for therapy: \"To get the swelling down and keep them down. \"    OBJECTIVE DATA SUMMARY:   EXAMINATION/PRESENTATION/DECISION MAKING:   Pain:  Pain Scale 1: Numeric (0 - 10)     Pain Intensity 1: 0     Pain Location 1: Leg     Pain Orientation 1: Left, Right             Self-Care and ADLs:  Grooming: Independent Bathing: Independent   UB Dressing: Independent LB Dressing: Independent   Don/Doff Shoes/Socks:  Independent Toileting: Independent       Skin and Tissue Assessment:  Dermal Status:  (x )  Intact ( )  Dry   ( )  Tenuous ( )  Flaky   ( )  Wound/lesion present ( )  Scars   ( )  Dermatitis    Texture/Consistency:  (x )  Boggy ( )  Pitting Edema   ( )  Brawny ( )  Combination   ( )  Fibrotic/Woody    Pigmentation/Color Change:  ( )  Normal ( )  Hemosiderin   ( )  Red ( )  Erythematous   (x )  Hyperpigmented- mild bilateral lower legs, patient reports this has improved significantly since treatment was initiated ( )  Hyperlipodermatosclerosis   Anomalies:  ( ) Lymphorrhea ( )  Vesicles   ( )  Petechiae ( )  Warty Vercusis   ( )  Bullae ( )  Papilloma   Circulatory:  ( )  GALEN ( )  Varicosities: ( X)  Pulses Dorsalis Pedis is palpable bilaterally ( x)  Vascular studies ruled out DVT in past 2/2017 and  12/1/2015, negative for underlying venous reflux   ( )  DVT History    Nails:  (x )  Normal  ( )  Fungus  Stemmers Sign: Positive bilaterally  Height:  Height: 5' 2\" (157.5 cm)  Weight:  Weight: 109.3 kg (241 lb)   BMI:  BMI (calculated): 44.1  (36 or greater: adversely affecting lymphedema)  Patient's weight has decreased since last June  Wound/Ulcer:  N/A      Wound Pain:   N/A  Volumetric Measurements:   Right:  14,371.69 mL Left:  14,097. 47 mL   % Difference: 1.95%    (See scanned graph)  Patient's right lower extremity was 15,429.93ml and left lower extremity was 14,777.79 ml on 4/4/17. Her right lower extremity limb volume has decreased by 1,058. 26ml and the left lower has decreased by 680. 32ml since 4/4/17.   Range of Motion: Grossly within functional limits bilaterally  Strength: Grossly within functional limits bilateral lower extremities as observed in clinic today  Sensation:  Intact    Mobility:   Bed/Chair Mobility:  Independent Transfers:  Independent   Sitting Balance:  Good Standing Balance: Good   Gait:  Independent without assistive device with wide base of support Wheelchair Mobility:  N/A   Endurance:  Good Stairs:  Not assessed       Safety:  Patient is alert and oriented:  Yes   Safety awareness:  Good   Fall Risk?:  Low risk, she does not report a history of falls   Patient given written fall prevention handout: Yes   Precautions:  Standard lymphedema precautions to include avoiding blood pressure readings, injections and IVs or other procedures/acts that could lead to broken skin on affected area, and avoiding excessive heat, resistive activity or altitude without compression garment     Based on the above components, the patient evaluation is determined to be of the following complexity level: LOW     Evaluation Time: 3422-0561  35 minutes    TREATMENT PROVIDED:   1. Treatment description:  Manual Therapy: Patient instructed in skin care principles and anatomy of the lymphatic system. Patient is performing skin care daily with Eucerin lotion. Reviewed in detail the signs and symptoms of cellulitis and how to respond to these signs by contacting her doctor should they occur. She verbalized understanding. Reviewed home exercise program and self manual lymph drainage techniques which patient is performing daily at home. Donned bilateral lower extremity custom Circaid Juxtafits with good fit and good product condition at this time. They are not currently requiring replacement. Educated patient in other night compression garment options such as Seth paks and Solar[employer] tributes as an alternative to Hayden [employer] Hayden style garments for night compression. Patient will be due to order new night compression garments when she returns in 6 months. Proceeded to measure patient for new custom bilateral lower extremity Juzo Expert knee highs with silicone band and closed toes in class 2. Measured patient for bilateral lower extremity custom compression christ pants with border with adjustable waistband. Assessed fit of current stockings which are loose in the trunk and thighs due to patient's decreased size. Order will be sent to [address] [place] [address] at patient's request and with the new size changes she will return for a fitting in a couple of weeks upon garment delivery. Treatment time: 7448-3302  Minutes: 54    ASSESSMENT:   Rocio Choi is a 61 y.o. female who presents with bilateral lower extremity primary lymphedema. Patient has been following her home program diligently and as a result her limb volumes have decreased bilaterally since she was last seen in therapy.   Her current daytime flat-knit compression garments are due to be replaced and with her volume decreases patient will require remeasuring. Her night compression garments are in good condition and are not currently due to be replaced. The patient's condition is complicated by arthritis and HTN. She will benefit from complete decongestive therapy (CDT) to review her self management home program and to measure and fit her for her new compression garments. Patient will receive instruction in proper skin care to recognize signs/symptoms of and prevent infection, therapeutic exercise, and self-MLD for independent home program and restorative lymphatic performance. This care is medically necessary due to the infection risk with lymphedema and to improve functional activities. CDT is necessary to resolve swelling to allow patient to return to wearing normal clothes/footwear and prevent worsening of symptoms, such as venous stasis ulcerations, infections, or hospitalizations. Patient will be independent with home program strategies to allow improved ADL ability and mobility and to allow patient to return to greatest functional independence. Rehabilitation potential is considered to be Good. Factors which may influence rehabilitation potential include leg pain. Patient will benefit from 1-2 physical therapy visits over 4-6 weeks to optimize improvement in these areas. PLAN OF CARE:   Recommendations and Planned Interventions:  Manual lymph drainage/complete decongestive therapy  Compression garment fitting/provision  Lymphedema therapeutic exercise  Self-care training  Education in skin care and lymphedema precautions  Wound care as needed     GOALS  Short term goals  Time frame: 1/15/18  1. Patient will be measured for new custom daytime compression garments to continue her self-management routine and promote optimal limb decongestion.   2.  Patient will receive properly fitting custom flat-knit compression garments and she will be independent with don/doff of compression system and use in order to prevent reaccumulation of fluid at discharge. Patient has participated in goal setting and agrees to work toward plan of care. Patient was instructed to call if questions or concerns arise. Thank you for this referral.   CHANCE Montiel, TERESITA-GABY   Time Calculation: 90 mins    TREATMENT PLAN EFFECTIVE DATES:   11/16/2017 TO 2/11/17  I have read the above plan of care for Cecelia Nunez. I certify the above prescribed services are required by this patient and are medically necessary.   The above plan of care has been developed in conjunction with the lymphedema/physical therapist.       Physician Signature: ____________________________________Date:______________

## 2017-12-11 ENCOUNTER — APPOINTMENT (OUTPATIENT)
Dept: PHYSICAL THERAPY | Age: 57
End: 2017-12-11

## 2018-01-29 ENCOUNTER — HOSPITAL ENCOUNTER (OUTPATIENT)
Dept: PHYSICAL THERAPY | Age: 58
Discharge: HOME | End: 2018-01-29
Payer: COMMERCIAL

## 2018-01-29 PROCEDURE — 97140 MANUAL THERAPY 1/> REGIONS: CPT

## 2018-01-29 NOTE — PROGRESS NOTES
[facility]  Physical Therapy Lymphedema Clinic  [address] [facility], [address] [place] [address] 22.    LYmphedema Therapy  Visit: 2    []                  Daily note               [x]          Discharge summary. NAME: Bill Mackey  DATE: 1/29/2018    GOALS     Short term goals  Time frame: 1/15/18  1. Patient will be measured for new custom daytime compression garments to continue her self-management routine and promote optimal limb decongestion. Goal met 1/29/18. 2.  Patient will receive properly fitting custom flat-knit compression garments and she will be independent with don/doff of compression system and use in order to prevent reaccumulation of fluid at discharge. Goal met 1/29/18. Patient's compression garments fit well and she is independent in donning and doffing. Right lower extremity limb volume has decreased by 574.03ml and left lower extremity has decreased by 201.7ml since patient was last assessed 11/16/17. The right lower extremity has decreased by a total of 1259.96ml and the left lower extremity has decreased by a total of 1254.35ml since 4/4/17. Goal met 1/29/18.             SUBJECTIVE REPORT:  I just got my stockings about a week ago. They took a long time, but they feel so much better than the old ones and the fit really well. I've had no problems with them.    Pain:  Pain Scale 1: Numeric (0 - 10)     Pain Intensity 1: 0     Pain Location 1: Leg     Pain Orientation 1: Left, Right           Gait:    [x]  Independent gait without any assistive device for community distances  ADLs: Independent with ADLS and self care activities  Treatment Response:    []   Patient reviewed packet received at evaluation  [x]   Patient completed home program as prescribed  []   Patient not fully compliant with home program to date  []   Patient waiting on compression garment arrival  Function: Patient is independent with all of her self management routines. []   Patient requiring less assistance with completion of home program  []   ADLs are requiring less assistance   []   Patient able to return to work/leisure activities  Lymphedema Life Impact Scale: scores 6 with 9% impairment  TREATMENT AND OBJECTIVE DATA SUMMARY:   Patient/Family Education:      Educated in skin care: [x]   Skin care products  [x]   Hygiene- discussed options with compression garment materials to address recent fungal infections  [x]  Prevention of cellulitis  []   Wound care     Educated in exercise: [x]   Walking program- previously educated  []   Coni ball routine  []   Stick routine  [x]   ROM routine previously educated     Instructed in self MLD:   Written sequence given and reviewed with patient previously as well as demonstration and instruction during MLD portion of the session. Instructed in don/doff of compression system:   []   Multi layer bandage (MLB) donning principles and wear precautions  [x]   Day garments. Educated patient in alternate material options such as Expert silver and Expert cotton and demonstrated samples of the materials. Educated patient in compression level options per her interest in increased thigh compression. [x]   Night garments     Therapeutic Activity 0 minutes   Treatment time: N/A  Functional Mobility: Independent with all gait and transfers   Fall risk: Low- patient does report a recent fall that occurred as a result of unevenly placed floor tiles that she caught her toe on when walking down a hallway. Therapeutic Exercise/Procedure 0  minutes   Treatment time: N/A      Patient is independent with previous home exercise programs that she was instructed                                       During her last round of treatment.     Coni ball exercise program:  N/A   Stick exercise program: N/A   Free exercises/ROM: N/A   Home program: Patient to perform daily to BID:  [x]   Skin care  [x]   Deep abdominal breathing  [x]   Exercise routine  [x]   Walking program  []   Rest in supine   []   Compression bandage  [x]   Compression garments  [x]   Vasopneumatic device  [x]   Wound care  []   Self MLD  []   Bring supplies to each therapy visit  []   Purchase necessary supplies  []   Weight loss program  []   Follow up with       Rationale: Exercise will increase the lymph angiomotoricity and tissue pressure of the skin and thus decrease swelling. Modalities 0 minutes   Treatment time: N/A  Vasopneumatic pump: Patient has a Flexitouch in place in her home and she uses the product daily. Manual Lymphatic Drainage (MLD) 60 minutes   Treatment time: 0735-0795  Area to decongest:    [] UE          []  Right     []  Left      [] Trunk      [x] LE             [x]  Right     [x]  Left      [x] Trunk         Sequence used and effectiveness: Deferred today as patient is independent with self manual lymph drainage and a Flexitouch advanced pneumatic device is in place at home.    [] Secondary/Primary sequence for lower extremities with / without trunk involvement      Skin/wound care/debridement: Skin is 100% intact. No dryness is noted today. Upper/Lower extremity compression:  Patient arrived wearing  her new custom Juzo Expert compression knee highs and christ pants. Excellent product fit is noted and patient demonstrates independence with donning and doffing in the clinic. Patient reports having recent infections in her skin folds along the abdomen and she is wondering if wearing the stockings is contributing to the increased perspiration and moisture build-up in the area. Discussed other material options with patient and demonstrated the [address] for alternative options with flat-knit stockings but a different skin contact material.  Patient will consider these options, but does not wish to order a different material at this time. She requests a reorder of the current compression garments.   Order will be faxed to [facility].  Patient expresses an interest in continuing to address her thigh swelling and therapist discussed the possibility of increasing her christ compression from a class 1 to a class 2. Patient does not wish to change anything for her current reorder, but she will consider this option in the future. Kinesiotaping: N/A   Girth/Volume measurement: Reviewed results of volumetric measurements taken on evaluation. Reassessed circumferences for bilateral lower extremities today and reviewed the results. TOTAL TREATMENT 60 mins     Circumferential Limb Measurements:  Bilateral lower extremities affected, right larger than left. Right lower extremity limb volume is 14,169. 97ml versus 14,371. 67ml on 11/16/17 and 15,429.93ml on 4/4/17. Left lower extremity limb volume is 13,523.44ml versus 14,097.47 ml on 11/16/17 and 14,777.79ml on 4/4/17. ASSESSMENT:   Treatment effectiveness and tolerance: Patient's compression garments fit her extremely well and her limb volumes have decreased bilaterally since she was last assessed and since she initiated wearing her new custom compression stockings. Patient is extremely compliant with her home management program and she will benefit from a second pair of custom compression garments for laundering and wear. The order will be placed with [address] [place] [address]. No further Lymphedema Physical Therapy needs are currently noted. Patient will be discharged to home restorative part of her lymphedema treatment and she will return in 6 months for re-evaluation of her swelling and her compression product needs. Progress toward goals: See goals above. All goals were met. PLAN OF CARE:   Changes to the plan of care:  Discharge to home restorative phase of lymphedema treatment. Patient to continue with her daily day and night compression garment wearing schedule, daily Flexitouch use, skin care, and exercise.      Frequency: [x]   Re-evaluate patient in 6 months   Other:          CHANCE Berg, MOMO

## 2018-06-26 ENCOUNTER — HOSPITAL ENCOUNTER (OUTPATIENT)
Dept: PHYSICAL THERAPY | Age: 58
Discharge: HOME | End: 2018-06-26
Payer: COMMERCIAL

## 2018-06-26 PROCEDURE — 97140 MANUAL THERAPY 1/> REGIONS: CPT

## 2018-06-26 PROCEDURE — 97161 PT EVAL LOW COMPLEX 20 MIN: CPT

## 2018-06-26 NOTE — PROGRESS NOTES
[facility]  Physical Therapy Lymphedema Clinic  [address] [facility], [address] [place] [address], [facility] 22.    INITIAL EVALUATION    NAME: Natalya Nichole AGE: 60 y.o. GENDER: female  DATE: 6/26/2018  REFERRING PHYSICIAN: Dr. Schwarz Im:   Primary Diagnosis:  · Primary lymphedema (Q82.0)  Other Treatment Diagnoses:  · Abnormality of gait (R26.9)  · Swelling not relieved by elevation (R60.9)  Date of Onset: Referred 3/2017. Patient reports swelling has been present for years since she was young, but she was first diagnosed 11/24/2015. Patient has numerous family members who present with similar swelling symptoms and this strong family history is indicative of primary lymphedema. Patient returns for 6 month re-evaluation today. Present Symptoms and Functional Limitations:  Patient reports  her swelling is a little bit worse today secondary to the heat and her weight is slightly up. She wears her compression garments daily and her velcro garments are wearing out with the velcro. She reports she only has the one daytime compression stocking set from earlier in the year and she did not end up reordering them because she was having some rolling up in the waist area and feels this needs to be adjusted on reorder. Previously reported fungal infections in the hip creases are improved and are not currently a problem. She received her Flexitouch and is using it at home daily. Patient reports a history of chronic swelling bilateral lower extremities with the right lower extremity being worse than the left. Lymphedema Life Impact Scale: Score of 28 and impairment percentage of 41. See scanned document.   Past Medical History:   Past Medical History:   Diagnosis Date    Arthritis     Hypertension     Ill-defined condition     bilateral LE lymphedema     Past Surgical History:   Procedure Laterality Date    HX APPENDECTOMY      HX GYN endometrial ablasion    HX UROLOGICAL      bladder sling    JADEN BIOPSY BREAST STEREOTACTIC Right     BENIGN     Current Medications:    Current Outpatient Prescriptions   Medication Sig    potassium chloride (K-DUR, KLOR-CON) 20 mEq tablet Take 20 mEq by mouth two (2) times a day.  metoprolol succinate (TOPROL-XL) 50 mg XL tablet Take 50 mg by mouth daily.  chlorthalidone (HYGROTEN) 25 mg tablet Take 25 mg by mouth daily.  losartan (COZAAR) 100 mg tablet Take 100 mg by mouth daily.  atorvastatin (LIPITOR) 40 mg tablet Take 40 mg by mouth daily.  oxybutynin chloride XL (DITROPAN XL) 15 mg CR tablet Take 30 mg by mouth daily.  amLODIPine (NORVASC) 10 mg tablet Take 10 mg by mouth daily.  cloNIDine HCl (CATAPRES) 0.1 mg tablet Take 0.1 mg by mouth two (2) times a day.  aspirin 81 mg chewable tablet Take 81 mg by mouth daily.  multivit-min-FA-lycopen-lutein (CENTRUM SILVER) 0.4-300-250 mg-mcg-mcg tab Take  by mouth.  omega 3-dha-epa-fish oil (FISH OIL) 100-160-1,000 mg cap Take  by mouth.  Cholecalciferol, Vitamin D3, (VITAMIN D3) 2,000 unit cap capsule Take 2,000 Units by mouth two (2) times a day.  esomeprazole (NEXIUM) 40 mg capsule    Probiotic 1 in am  carafate 1g/10ml 3 times a day Take 40 mg by mouth daily. No current facility-administered medications for this encounter. 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   She is  with 3 adult children   Living Situation: Lives with spouse     Trainable Caregiver?: Spouse, he currently assists her with compression garment use as needed   Self-care/ADLs: Independent    Mobility: Independent   Sleeping Arrangement:  Sleeps in bed    Adaptive Equipment Owned: None noted by patient     Previous Therapy:  Patient has received Lymphedema treatment at [facility] lymphedema program in the past with her last appointment in June 2016. She was seen at [address] [place] [address] Lymphedema program 4/4/2017 - 5/2017, and 11/16/17-1/29/18. Compression/Lymphedema Equipment:  Custom flat-knit Juzo compression knee highs bilateral lower extremities and christ pants. She also has full leg bilateral lower extremity Circaid Juxtafits for night use. She also reports using body glue to assist with Juxtafit suspension on her thighs secondary to keep them form slipping down. She consistently performs skin care with Eucerin and lymphedema exercises regularly. She has a Flexitouch which she uses daily. SUBJECTIVE:  The last set of garments were rolling at my waist, I think it was because I lost weight so I never ordered the second set. They fit a little bit better now because my weight is up a little bit. The velcro on my Juxtafits isn't stocking well anymore. I am using that extra velcro piece to hold it in place. I know my legs are a little bit more swollen today. Patients goals for therapy: \"To assess my swelling and get new compression garments. \"    OBJECTIVE DATA SUMMARY:   EXAMINATION/PRESENTATION/DECISION MAKING:   Pain:  Patient does reports some intermittent pain at night on the left lateral thigh and lower leg that usually occurs when she is lying on that side. It decreases with position changes. Pain Scale 1: Numeric (0 - 10)     Pain Intensity 1: 0     Pain Location 1: Leg     Pain Orientation 1: Left, Right             Self-Care and ADLs:  Grooming: Independent Bathing: Independent   UB Dressing: Independent LB Dressing: Independent   Don/Doff Shoes/Socks:  Independent Toileting: Independent       Skin and Tissue Assessment:  Dermal Status:  (x )  Intact ( )  Dry   ( )  Tenuous ( )  Flaky   ( )  Wound/lesion present ( )  Scars   ( )  Dermatitis    Texture/Consistency:  (x )  Boggy in lower legs ( )  Pitting Edema   ( )  Brawny ( )  Combination   ( )  Fibrotic/Woody    Pigmentation/Color Change:  ( )  Normal ( )  Hemosiderin   ( )  Red ( )  Erythematous   (x )  Hyperpigmented- mild bilateral lower legs, patient reports this has improved significantly since treatment was initiated ( )  Hyperlipodermatosclerosis   Anomalies:  ( )  Lymphorrhea ( )  Vesicles   ( )  Petechiae ( )  Warty Vercusis   ( )  Bullae ( )  Papilloma   Circulatory:  ( )  GALEN ( )  Varicosities: ( X)  Pulses Dorsalis Pedis is palpable bilaterally ( x)  Vascular studies ruled out DVT in past 2/2017 and  12/1/2015, negative for underlying venous reflux   ( )  DVT History    Nails:  (x )  Normal  ( )  Fungus  Stemmers Sign: Positive bilaterally  Height:     5'2' Weight:     246.6lbs  BMI:     45.1  (36 or greater: adversely affecting lymphedema)  Patient's weight has increased from 241.0lbs 1/29/18, BMI of 44.1  Wound/Ulcer:  N/A      Wound Pain:   N/A  Volumetric Measurements:   Right:  14,070.27 mL Left:  13,661.78mL   % Difference: 2.99% right larger than left    (See scanned graph)  Patient's right lower extremity was 15,429.93ml and left lower extremity was 14,777.79 ml on 4/4/17. Her right lower extremity limb volume has decreased by 1,359.66ml and the left lower has decreased by 1,116.01ml since 4/4/17. The left lower extremity has increased by 138. 34ml and the right leg has decreased by 99.7ml since last assessment on 1/29/18.   Range of Motion: Grossly within functional limits bilaterally  Strength: Grossly within functional limits bilateral lower extremities as observed in clinic today  Sensation:  Intact    Mobility:   Bed/Chair Mobility:  Independent Transfers:  Independent   Sitting Balance:  Good Standing Balance: Good   Gait:  Independent without assistive device with wide base of support Wheelchair Mobility:  N/A   Endurance:  Good Stairs:  Not assessed       Safety:  Patient is alert and oriented:  Yes   Safety awareness:  Good   Fall Risk?:  Low risk, she does not report a history of falls   Patient given written fall prevention handout: Yes   Precautions:  Standard lymphedema precautions to include avoiding blood pressure readings, injections and IVs or other procedures/acts that could lead to broken skin on affected area, and avoiding excessive heat, resistive activity or altitude without compression garment     Based on the above components, the patient evaluation is determined to be of the following complexity level: LOW     Evaluation Time: 0276-9927  30 minutes    TREATMENT PROVIDED:   1. Treatment description:  Manual Therapy:  Patient is performing skin care daily with Eucerin lotion. Reviewed home program and patient is performing self manual lymph drainage techniques, wears her stockings daily, wears velcro Circaid garments at night, uses her Flexitouch daily and is exercising. She has no questions about her home management routines or techniques. She does report that her Flexitouch garments are unraveling and therapist will notify [employer]  of these issues. Patient reports she did not order a second set of the flat-knit garments in February as planned due to some fit issues in the waist portion of the garment. The christ style pants and knees highs are both due for replacement. Her Circaid Juxtafits are now worn and velcro is no longer sticking as wells as needed to be effective so she will require new full leg Circaid Juxtafits as well. Measured for bilateral lower extremity custom Premium Circaid Juxtafits lower leg, and thigh piece with attached knee and new single band AFW. Reviewed compression stocking options with patient including helastic silver, helastic cotton, and [employer]x products.   Patient expresses interest in trying the silver helastic material to see if it is cooler to wear than her current helastic garments. Discussed issues with previous christ pant with patient reporting the garment rolled at the waist end and she feels it may have related to her decreased weight at the time. Her weight has increased by about 5 pounds since she was last seen in clinic. Proceeded to measure patient for new custom bilateral lower extremity Juzo Expert (helastic) silver knee highs with silicone band and closed toes in class 2. Measured patient for bilateral lower extremity custom compression expert silver christ pants with border with adjustable waistband. Adjustments were made to previous lengths to address issues at the waist.  Order will be sent to "[employer]" which is in network with her insurance company. Reviewed 30 day remake policy with [employer] with patient. She will return for a fitting in a couple of weeks upon garment delivery. Treatment time: 4589-3502  Minutes: 79    ASSESSMENT:   Sharlene Delaney is a 60 y.o. female who presents with bilateral lower extremity primary lymphedema. Patient has been following her home program diligently, but does have some mild increases in her left leg volume, right leg volume is slightly decreased. Her current daytime flat-knit compression garments and night-time velcro style compression garments are due to be replaced. The patient's condition is complicated by arthritis and HTN. She will benefit from complete decongestive therapy (CDT) to measure and fit her for her new compression garments. Patient will be fitted for garments upon delivery to promote continued independent home program and restorative lymphatic performance. This care is medically necessary due to the infection risk with lymphedema and to improve functional activities.   CDT is necessary to resolve swelling to allow patient to return to wearing normal clothes/footwear and prevent worsening of symptoms, such as venous stasis ulcerations, infections, or hospitalizations. Patient will be independent with home program strategies to allow improved ADL ability and mobility and to allow patient to return to greatest functional independence. Rehabilitation potential is considered to be Good. Factors which may influence rehabilitation potential include leg pain. Patient will benefit from 2-4 physical therapy visits over 8-12 weeks to optimize improvement in these areas. PLAN OF CARE:   Recommendations and Planned Interventions:  Manual lymph drainage/complete decongestive therapy  Compression garment fitting/provision  Lymphedema therapeutic exercise  Self-care training  Education in skin care and lymphedema precautions        GOALS    Time frame: 8/30/18  1. Patient will receive properly fitting Circaid Juxtafits for night compression wearing schedule and she will be independent with product use to manage her chronic lymphedema and prevent reaccumulation of fluid. 2.  Patient will receive properly fitting custom flat-knit compression garments and she will be independent with don/doff of compression system and use in order to prevent reaccumulation of fluid at discharge. Patient has participated in goal setting and agrees to work toward plan of care. Patient was instructed to call if questions or concerns arise. Thank you for this referral.   CHANCE Monson, CLROB-GABY   Time Calculation: 100 mins    TREATMENT PLAN EFFECTIVE DATES:   6/26/2018 TO 9/21/18  I have read the above plan of care for James Redd. I certify the above prescribed services are required by this patient and are medically necessary.   The above plan of care has been developed in conjunction with the lymphedema/physical therapist.       Physician Signature: ____________________________________Date:______________

## 2018-06-27 VITALS — WEIGHT: 246.6 LBS | BODY MASS INDEX: 45.38 KG/M2 | HEIGHT: 62 IN

## 2018-08-09 ENCOUNTER — HOSPITAL ENCOUNTER (OUTPATIENT)
Dept: PHYSICAL THERAPY | Age: 58
Discharge: HOME | End: 2018-08-09
Payer: COMMERCIAL

## 2018-08-09 VITALS — HEIGHT: 62 IN | BODY MASS INDEX: 45.45 KG/M2 | WEIGHT: 247 LBS

## 2018-08-09 PROCEDURE — 97140 MANUAL THERAPY 1/> REGIONS: CPT

## 2018-08-09 NOTE — PROGRESS NOTES
[facility]  Physical Therapy Lymphedema Clinic  [address] [facility], [address] [place] [address] 22.    LYmphedema Therapy  Visit:2    []                  Daily note               [x]                 Discharge Summary/Progress Note    NAME: Connie Mendiola  DATE: 8/9/2018    GOALS   Time frame: 8/30/18  1. Patient will receive properly fitting Circaid Juxtafits for night compression wearing schedule and she will be independent with product use to manage her chronic lymphedema and prevent reaccumulation of fluid. Patient has received her new custom Circaid Juxtafit products and the fit is good. Patient demonstrated that she could independently don/doff products. Patient's volumes have reduced since receiving her new products. Currently the smallest volumes that she has had since beginning with our clinic in 2017. Goal Met 8/9/18  2. Patient will receive properly fitting custom flat-knit compression garments and she will be independent with don/doff of compression system and use in order to prevent reaccumulation of fluid at discharge. Patient has received her new custom Juzo Silver Capris/knee high products and the fit is good. Patient demonstrated that she could independently don/doff products. Patient's volumes have reduced since receiving her new products. Currently the smallest volumes that she has had since beginning with our clinic in 2017. Goal Met 8/9/18       SUBJECTIVE REPORT: \"I am doing well, I have been wearing my products since I received them. \" Patient arrived today with her custom Juzo silver products on and also brought in her Custom JuxtaFit velcro products to be assessed. Patient reports that her referring MD is leaving the practice, so she will find another physician in the group for her follow up care.     Pain:0/10  Gait:    [x]  Independent gait without any assistive device for community distances  ADLs:   Treatment Response: Patient arrived today for assessment of her new garments. Patient reports she has no fit issues and has been working with the products since she received them. Patient reports that she would like a second set of day time garments, but is concerned with the cost. Will have vendor to discuss with patient as a second set will be beneficial and ease her burden of care with taking care of her garments daily. [x]   Patient reviewed packet received at evaluation  [x]   Patient completed home program as prescribed  []   Patient not fully compliant with home program to date  []   Patient waiting on compression garment arrival  Function: Patient works full time and tries to stay active. Able to complete her home program on her own. []   Patient requiring less assistance with completion of home program  []   ADLs are requiring less assistance   [x]   Patient able to return to work/leisure activities  Lymphedema Life Impact Scale: Patient scored a 14 on the LLIS with a percent impairment of 21%. Weight: 247.0 lbs up from re-evaluation weight of 246.6 lbs  TREATMENT AND OBJECTIVE DATA SUMMARY:   Patient/Family Education:      Educated in skin care: [x]   Skin care products  [x]   Hygiene  [x]  Prevention of cellulitis  []   Wound care     Educated in exercise: [x]   Walking program  [x]   Coni ball routine  []   Stick routine  [x]   ROM routine     Instructed in self MLD:   Written sequence given and reviewed with patient as well as demonstration and instruction during MLD portion of the session.      Instructed in don/doff of compression system:   []   Multi layer bandage (MLB) donning principles and wear precautions  [x]   Day garments (Custom Juzo products for daytime)  [x]   Night garments (Custom JuxtaFit Premiums for night time)     Therapeutic Activity 0  minutes   Treatment time: 0  Functional Mobility: Independent    Fall risk: Low     Therapeutic Exercise/Procedure 0 minutes   Treatment time:0  Coni ball exercise program: Deferred Stick exercise program: N/A   Free exercises/ROM: Patient walks daily on her lunch break as able and is performing exercises BID as able. Home program: Patient to perform daily to BID:  [x]   Skin care  [x]   Deep abdominal breathing  [x]   Exercise routine  [x]   Walking program  [x]   Rest in supine   []   Compression bandage  [x]   Compression garments  [x]   Vasopneumatic device  []   Wound care  []   Self MLD  [x]   Bring supplies to each therapy visit  [x]   Purchase necessary supplies (recommended if patient was able to try to purchase her second set of day time garments as soon as possible)  [x]   Weight loss program  []   Follow up with       Rationale: Exercise will increase the lymph angiomotoricity and tissue pressure of the skin and thus decrease swelling. Modalities 0 minutes   Treatment time: 0  Vasopneumatic pump: Patient has a Flexitouch vaso-pneumatic pump for home use. Patient recently received new pump garments. Patient uses pump daily. Manual Lymphatic Drainage (MLD) 45 minutes   Treatment time: 8:05am-8:50am  Area to decongest:    [] UE          []  Right     []  Left      [] Trunk      [x] LE             [x]  Right     [x]  Left      [x] Trunk         Sequence used and effectiveness: [] Secondary/Primary sequence for upper extremity with / without trunk involvement    [] Secondary/Primary sequence for lower extremities with / without trunk involvement     [] Modifications were made to manual lymph drainage sequence to exclude cervical techniques secondary to patient's age    [x] Self MLD sequence/techniques/hand strokes  Patient also has Flexitouch vaso-pneumatic pump that she uses daily. Patient reports that she just received new garments for her pump recently. Skin/wound care/debridement: Intact   Upper/Lower extremity compression: Patient arrived wearing her new Juzo Silver custom garments.   Patient received custom bilateral lower extremity Juzo Expert (helastic) silver knee highs with silicone band and closed toes in class 2 and  custom compression expert silver christ pants with border with adjustable waistband. The fit is good and patient is pleased with the products. She would like a second set, but is concerned with her costs as she had to pay over $1000 for her portion of her current order. Discussed with patient that only having one set limits the life span of the garments, which could potentially  cause her to swell and they would need to be replaced sooner. Patient agreed to have vendor look in to the cost of a second set of day time products and get back with her on costs. This will determine if patient needs to return in 3 months versus 6 months. Patient also received  bilateral lower extremity custom Premium Circaid Juxtafits lower leg, and thigh piece with attached knee and new single band AFW. Patient able to demonstrate in the clinic that she could don/doff all items independently in the clinic and is happy with the fit of all garments. Patient understands wear and care of products. Patient also has a Flexitouch vaso-pneumatic pump to use in the home and just received new garments. Kinesiotaping: Deferred   Girth/Volume measurement: Full volumes were taken today to reveal that patient has lost 1403 ml on the R LE and 893 ml on the L LE since her evaluation on 6/27/18. Patient's percent difference went from 2.99% on 6/27/18 to -.80% today. Patient's volumes are the lowest they have been since starting treatment in our clinic in 2017. See scanned volumes sheet for details. TOTAL TREATMENT 45 mins     Circumferential Limb Measurements:  Full volumes taken today see above and scanned volumes sheet for details. ASSESSMENT:   Treatment effectiveness and tolerance: Patient received her new garments and was able to have a good fit of all items at this time. She has reduced volumes bilaterally since evaluation on 6/26/18.  Patient currently at her lowest limb volumes since seeking treatment in this clinic in 2017. Patient would benefit from a second set of day time garments to continue to manage her condition during phase 2 of care/self management. Patient does have concerns about having to pay portion of her co-pay so we will contact vendor to see if they can give her an update on these costs. A 3 month follow up for evaluation was set up in case she is unable to get her second set of garments. She will need to be assessed for new day time garments at that time to prevent her from increased volumes from worn out products. Patient understood that if she receives a second set of day time garments that her reassessment will be reschedule to February as she would have adequate garments. She is aware that vendor will contact her with information on the cost of second set. Patient is also to contact the clinic if she has any concerns prior to that scheduled appointment. Progress toward goals: Patient has met all goals set and evaluation. PLAN OF CARE:   Changes to the plan of care: Patient to be discharged to the restorative phase of care and return in November vs February depending on if she purchases a second set of garments. Frequency: []  2 times a week  []  Weekly  []  Biweekly  []  Monthly  [x]  Follow up in November if she has not received second set of garments. If garments received then she can return early in February for reassessment. Other: Will contact [address] [place] [address] to contact patient in regards to second set of garments when she is ready to purchase before the end of the year. If patient able to receive second set her reassessment can be moved to a later date. Currently with one set of garments they will need to be replaced by November.         Uriah Baldwin, PTA, CLT  Carla Tanner, MSPT, CLTWILIAN

## 2018-10-17 ENCOUNTER — HOSPITAL ENCOUNTER (OUTPATIENT)
Dept: MAMMOGRAPHY | Age: 58
Discharge: HOME | End: 2018-10-17
Attending: OBSTETRICS & GYNECOLOGY
Payer: COMMERCIAL

## 2018-10-17 DIAGNOSIS — Z12.31 VISIT FOR SCREENING MAMMOGRAM: ICD-10-CM

## 2018-10-17 PROCEDURE — 77063 BREAST TOMOSYNTHESIS BI: CPT

## 2018-11-13 ENCOUNTER — APPOINTMENT (OUTPATIENT)
Dept: PHYSICAL THERAPY | Age: 58
End: 2018-11-13

## 2019-02-11 ENCOUNTER — APPOINTMENT (OUTPATIENT)
Dept: PHYSICAL THERAPY | Age: 59
End: 2019-02-11

## 2019-06-25 ENCOUNTER — HOSPITAL ENCOUNTER (OUTPATIENT)
Dept: PHYSICAL THERAPY | Age: 59
Discharge: HOME | End: 2019-06-25
Payer: COMMERCIAL

## 2019-06-25 VITALS — HEIGHT: 62 IN | WEIGHT: 248 LBS | BODY MASS INDEX: 45.64 KG/M2

## 2019-06-25 PROCEDURE — 97140 MANUAL THERAPY 1/> REGIONS: CPT

## 2019-06-25 PROCEDURE — 97161 PT EVAL LOW COMPLEX 20 MIN: CPT

## 2019-06-25 NOTE — PROGRESS NOTES
[facility]  Physical Therapy Lymphedema Clinic  [address], [address], [place]  [place].    INITIAL EVALUATION    NAME: Emilee Ortega AGE: 60 y.o. GENDER: female  DATE: 6/25/2019  REFERRING PHYSICIAN: Dr. Florinda Bowers Stone  HISTORY AND BACKGROUND:   Primary Diagnosis:  · Primary lymphedema (Q82.0)  Other Treatment Diagnoses:  · Abnormality of gait (R26.9)  · Swelling not relieved by elevation (R60.9)  Date of Onset: Referred 3/2017. Patient reports swelling has been present for years since she was young, but she was first diagnosed 11/24/2015. Patient has numerous family members who present with similar swelling symptoms and this strong family history is indicative of primary lymphedema. Patient returns for re-evaluation today. Present Symptoms and Functional Limitations:  Patient reports increased swelling and delayed return to the clinic due to traumatic work injury in December that resulted in patient hitting her head (concussion) and having a fracture in her left forearm. As a result of the injuries, patient was unable to don any of her compression garments for several months and resumed self lymphedema management with old larger compression garments, Juxtafits at night and Flexitouch use in March of 2019. She reports at that time her legs were \"really large and swollen. \"  She eventually was able to return to wearing her Juzo silver garments which were the last ordered garments for her and are smaller than previous products. She reports the legs have improved but seem to be larger again this week. She had been exercising on a stationary bike with her juxtafits in place and she reports she noticed a dramatic improvement in her swelling but she is not using the bike as much the last 2 weeks and swelling increased again.  She reports she has 2 sets of Juzo silver daytime capris and knee highs that she was unable to wear for about 4 months due to her recovery. [address] [place] Lymphedema Assessment Scale:   Scores 5 out 20  Past Medical History:   Past Medical History:   Diagnosis Date    Arthritis     Hypertension     Ill-defined condition     bilateral LE lymphedema  Left forearm fracture 12/2018  Concussion 12/2018     Past Surgical History:   Procedure Laterality Date    HX APPENDECTOMY      HX GYN      endometrial ablasion    HX UROLOGICAL      bladder sling    JADEN BIOPSY BREAST STEREOTACTIC Right     BENIGN     Current Medications:    Current Outpatient Prescriptions   Medication Sig    potassium chloride (K-DUR, KLOR-CON) 20 mEq tablet Take 20 mEq by mouth two (2) times a day.  metoprolol succinate (TOPROL-XL) 50 mg XL tablet Take 50 mg by mouth daily.  chlorthalidone (HYGROTEN) 25 mg tablet Take 25 mg by mouth daily.  losartan (COZAAR) 100 mg tablet Take 100 mg by mouth daily.  atorvastatin (LIPITOR) 40 mg tablet Take 40 mg by mouth daily.  oxybutynin chloride XL (DITROPAN XL) 15 mg CR tablet Take 30 mg by mouth daily.  amLODIPine (NORVASC) 10 mg tablet Take 10 mg by mouth daily.  cloNIDine HCl (CATAPRES) 0.1 mg tablet Take 0.1 mg by mouth two (2) times a day.  aspirin 81 mg chewable tablet Take 81 mg by mouth daily.  multivit-min-FA-lycopen-lutein (CENTRUM SILVER) 0.4-300-250 mg-mcg-mcg tab Take  by mouth.  omega 3-dha-epa-fish oil (FISH OIL) 100-160-1,000 mg cap Take  by mouth.  Cholecalciferol, Vitamin D3, (VITAMIN D3) 2,000 unit cap capsule Take 2,000 Units by mouth two (2) times a day.  esomeprazole (NEXIUM) 40 mg capsule    Probiotic 1 in am  carafate 1g/10ml 3 times a day Take 40 mg by mouth daily. No current facility-administered medications for this encounter. 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 She is  with 3 adult children   Living Situation: Lives with spouse     Trainable Caregiver?: Spouse, he currently assists her with compression garment use as needed   Self-care/ADLs: Independent    Mobility: Independent   Sleeping Arrangement:  Sleeps in bed    Adaptive Equipment Owned: None noted by patient     Previous Therapy:  Patient has received Lymphedema treatment at [address] [place] [address] lymphedema program in the past with her last appointment in June 2016. She was seen at [facility] Lymphedema program 4/4/2017 - 5/2017, 11/16/17-1/29/18, and 6/26/18 to 8/9/18. Compression/Lymphedema Equipment:  Custom flat-knit Juzo silver compression knee highs bilateral lower extremities and christ pants ( 2 sets). She also has full leg bilateral lower extremity Circaid Juxtafits for night use. She reports using body glue to assist with Juxtafit suspension on her thighs secondary to keep them form slipping down. She consistently performs skin care with Eucerin and lymphedema exercises regularly. She has a Flexitouch which she uses daily. SUBJECTIVE:  If I ever wondered what would happen if I just stopped using my compression, I know now. My legs were so big when I could not use my garments. It really was surprising how bad they were. I was able to start working on them again in March and I eventually got back into the last stockings we ordered. You should have seen them 2 weeks ago they were so much better than they are today. I was exercising in my Juxtafits a lot then. They looked so good and the last couple of weeks I have not been using the bike so much and now my legs are bigger again. I don't understand why they would be bigger now. Patients goals for therapy:  \"To assess my swelling and get new compression garments. \"    OBJECTIVE DATA SUMMARY:   EXAMINATION/PRESENTATION/DECISION MAKING:   Pain:  Patient does not report any pain currently, but she states when her swelling worsened in the beginning of 2019 her pain was elevated in her legs. Pain Scale 1: Numeric (0 - 10)     Pain Intensity 1: 0                         Self-Care and ADLs:  Grooming: Independent Bathing: Independent   UB Dressing: Independent LB Dressing: Independent   Don/Doff Shoes/Socks:  Independent Toileting: Independent       Skin and Tissue Assessment:  Dermal Status:  (x )  Intact ( )  Dry   ( )  Tenuous ( )  Flaky   ( )  Wound/lesion present ( )  Scars   ( )  Dermatitis    Texture/Consistency:  (x )  Boggy in lower legs and thighs bilaterally ( )  Pitting Edema   ( )  Brawny ( )  Combination   ( )  Fibrotic/Woody    Pigmentation/Color Change:  ( )  Normal ( )  Hemosiderin   ( )  Red ( )  Erythematous   (x )  Hyperpigmented- mild bilateral lower legs, patient reports this has improved significantly since treatment was initiated ( )  Hyperlipodermatosclerosis   Anomalies:  ( )  Lymphorrhea ( )  Vesicles   ( )  Petechiae ( )  Warty Vercusis   ( )  Bullae ( )  Papilloma   Circulatory:  ( )  GALEN ( )  Varicosities:   ( )  Pulses Dorsalis Pedis is palpable bilaterally ( x)  Vascular studies ruled out DVT in past 2/2017 and  12/1/2015, negative for underlying venous reflux   ( )  DVT History    Nails:  (x )  Normal  ( )  Fungus  Stemmers Sign: Positive bilaterally  Height:  Height: 5' 2\" (157.5 cm)  5'2' Weight:  Weight: 112.5 kg (248 lb)  Weight was 247.0lbs on 8/9/18  BMI:  BMI (calculated): 45.3  45.1  (36 or greater: adversely affecting lymphedema)  Wound/Ulcer:  N/A      Wound Pain:   N/A  Volumetric Measurements:   Right:  14,521.16 mL (increased by 3,943.30 since 8/9/18) Left:  13,995.89mL (increased by 1,227.0ml since 8/9/19)   % Difference: 2.99% right larger than left    (See scanned graph)  Patient's right lower extremity was 15,429.93ml and left lower extremity was 14,777.79 ml on initial evaluation 4/4/17. Her right lower extremity was 12,667. 34ml and left lower extremity was 12,768. 89ml on last assessment on 8/9/18. Range of Motion: Grossly within functional limits bilaterally  Strength: Grossly within functional limits bilateral lower extremities as observed in clinic today  Sensation:  Intact    Mobility:   Bed/Chair Mobility:  Independent Transfers:  Independent   Sitting Balance:  Good Standing Balance: Good   Gait:  Independent without assistive device with wide base of support Wheelchair Mobility:  N/A   Endurance:  Good Stairs:  Not assessed       Safety:  Patient is alert and oriented:  Yes   Safety awareness:  Good   Fall Risk?:  Low risk, she does not report a history of falls   Patient given written fall prevention handout: Yes   Precautions:  Standard lymphedema precautions to include avoiding blood pressure readings, injections and IVs or other procedures/acts that could lead to broken skin on affected area, and avoiding excessive heat, resistive activity or altitude without compression garment     Based on the above components, the patient evaluation is determined to be of the following complexity level: LOW     Evaluation Time: 1025-7158  30 minutes    TREATMENT PROVIDED:   1. Treatment description:  Manual Therapy:  Patient is performing skin care daily with Eucerin lotion. Reviewed home program and patient is performing self manual lymph drainage techniques, wears her stockings daily, wears velcro Circaid garments at night, uses her Flexitouch daily and is exercising. She had questions about fluctuating swelling levels so discussed with her why her swelling decreased so much a couple of weeks ago and has now returned. She was exercising intensively on a stationary bike with her Juxtafits in place and noted decreased swelling. She has decreased her exercise frequency and swelling increased again.   Explained and reinforced with patient the muscle pumps role in lymphatic fluid movement and how it functions even more effectively when compression is in place with exercise. Discussed with patient that her limb volumes are increased bilaterally today. She is very concerned about this so discussed treatment with multi-layer bandaging and the benefits of bandaging for volume reduction prior to measuring for new compression garments so that she is measured at her smallest size. Patient reported dramatic improvement in her leg swelling with more intensive stationary bike exercise while wearing her Juxtafits. She has a stationary bike at home. If she does not wish to pursue bandaging in we know she decongested with the exercise she may also wish to resume the intensive exercise with compression in place and then return to therapy for new measurements once her leg swelling has improved. Patient verbalizes that she feels she can decrease her leg size herself with the exercise so she would like to try this first and then return to the clinic for new measurements. She could be bandaged later if she is unsuccessful. Therapist assessed her current Juzo silver christ and knee highs, which she wore into the clinic, and they are in good condition and appear to have some lifespan remaining in them. Discussed current compression level in her stockings and demonstrated the Juzo strong material to increase compression if needed and also discussed the option of increasing the compression level of her current stockings if she feels that they were not controlling her swelling as well as needed. This may make it harder to don the garments in the future. Patient to consider these options prior to returning to the clinic for new measurements. Patient donned her Circaid Juxtafits bilaterally independently and she is concerned about some gapping that is occurring between a couple of strap and between the thigh and lower leg garment.   She reports it is difficult to exercise with the extra knee cap velcro piece and is wondering if there are any options to help with this issue. Therapist constructed a make shift shelf strap cut from an old Juxtafit foot piece with velcro on each end and demonstrated placement in the problem areas to show how it can help to improve compression in these spots. Patient was provided with this strap to take home and work with. Demonstrated a premium shelf strap and provided her with information on sizing and how to order shelf straps herself to try. Patient was provided with the product names and she entered them into her [employer] joesph on her phone to pull up vendors and she plans to proceed to order maybe up to 4 shelf straps for home use. She was very excited about this information. Therapist suggested she try the make shift on and maybe order what she needs for one leg to be sure they accomplish what she needs them to do before purchasing more for the other leg. Discussed the Juxtafits which do look like they will need to be replaced, but patient may need to delay replacement due to insurance deductible and garment expense. Will discuss again when she returns for measuring. Patient also reports she did not care for the new single band AFW as she felt the old style worked better (for future ordering purposes). She will return for volume reassessment and garment measuring in August.  Treatment time: 3815-9452 Minutes: 50    ASSESSMENT:   Simba MANUEL Otilia Rosario is a 60 y.o. female who presents with bilateral lower extremity primary lymphedema. Patient had been following her home program diligently until she had a traumatic injury in December with a left upper extremity fracture that would not allow her to continue to don all of her compression products. She has returned to using all of her self management tools but her limb volumes remain elevated bilaterally.   Patient feels she can decrease the swelling with exercise and good compression use, but also discussed the option of multi-layer bandaging today. Her current daytime flat-knit compression garments and night-time velcro style compression garments are due to be replaced in the near future but patient is opting to work on decreasing her swelling prior to being measured for her products. Her condition is complicated by arthritis and HTN. She will benefit from complete decongestive therapy (CDT) to measure and fit her for new compression products, review self management techniques including therapeutic exercise and manual lymph drainage and incorporate multi-layer bandaging if needed. Patient will be fitted for garments upon delivery to promote continued independent home program and restorative lymphatic performance. This care is medically necessary due to the infection risk with lymphedema and to improve functional activities. CDT is necessary to resolve swelling to allow patient to return to wearing normal clothes/footwear and prevent worsening of symptoms, such as venous stasis ulcerations, infections, or hospitalizations. Patient will be independent with home program strategies to allow improved ADL ability and mobility and to allow patient to return to greatest functional independence. Rehabilitation potential is considered to be Good. Factors which may influence rehabilitation potential include leg pain. Patient will benefit from 3-5 physical therapy visits over 10-12 weeks to optimize improvement in these areas. PLAN OF CARE:   Recommendations and Planned Interventions:  Manual lymph drainage/complete decongestive therapy  Multi-layer compression bandaging if needed  Compression garment fitting/provision  Lymphedema therapeutic exercise  Self-care training  Education in skin care and lymphedema precautions        GOALS    Time frame: 09/20/19  1.   Patient will be measured and fitted for new custom compression flat-knit stockings to manage her chronic lymphedema and prevent reaccumulation of fluid. 2.  Patient will be independent with don/doff of compression system and use in order to prevent reaccumulation of fluid at discharge. Patient has participated in goal setting and agrees to work toward plan of care. Patient was instructed to call if questions or concerns arise. Thank you for this referral.   CHANCE Silvestre, CLT-GABY   Time Calculation: 85 mins    TREATMENT PLAN EFFECTIVE DATES:   6/25/2019 TO 9/20/2019  I have read the above plan of care for [address] Rodolfo Lopez. I certify the above prescribed services are required by this patient and are medically necessary. The above plan of care has been developed in conjunction with the lymphedema/physical therapist.       Physician Signature: ____________________________________Date:______________                                             Dr. Eduardo Lopez.  Alejandra Rodriguez

## 2019-08-22 ENCOUNTER — APPOINTMENT (OUTPATIENT)
Dept: PHYSICAL THERAPY | Age: 59
End: 2019-08-22
Payer: COMMERCIAL

## 2019-09-19 ENCOUNTER — APPOINTMENT (OUTPATIENT)
Dept: PHYSICAL THERAPY | Age: 59
End: 2019-09-19
Payer: COMMERCIAL

## 2019-10-14 ENCOUNTER — HOSPITAL ENCOUNTER (OUTPATIENT)
Dept: PHYSICAL THERAPY | Age: 59
Discharge: HOME | End: 2019-10-14
Payer: COMMERCIAL

## 2019-10-14 VITALS — WEIGHT: 248.4 LBS | BODY MASS INDEX: 45.71 KG/M2 | HEIGHT: 62 IN

## 2019-10-14 PROCEDURE — 97140 MANUAL THERAPY 1/> REGIONS: CPT

## 2019-10-14 NOTE — PROGRESS NOTES
[facility]  Physical Therapy Lymphedema Clinic  [address] [facility], [address] [place] [address], [facility] 22.    LYmphedema Therapy  Visit: 2    []                  Daily note               [x]      Progress Note with 90 day updated plan of care    NAME: Devika Brown  DATE: 10/14/2019    GOALS     Patient returns for first follow-up visit since evaluation. An updated plan of care will be established today. Time frame: 09/20/19 extend to 11/30/19  1. Patient will be measured and fitted for new custom compression flat-knit stockings to manage her chronic lymphedema and prevent reaccumulation of fluid. Patient was measured for custom day and night compression garments for bilateral lower extremities. Measured for Juzo expert christ and knee highs bilaterally and Circaid Juxtafit lower leg and knee/thigh garments. Also measured for Jaki Smita classic footpieces. Will fit for garments upon delivery. 2.  Patient will be independent with don/doff of compression system and use in order to prevent reaccumulation of fluid at discharge. Educated patient in donning and doffing the Jaki Smita classic footpiece. SUBJECTIVE REPORT: I was hoping to get my legs down more before you measured me for the garments but I haven't been able to do as much as I would have liked. It is really hard. I really need new garments and would like to go ahead and be measured for them. I want to get new velcro garments as well. Pain:  Pain Scale 1: Numeric (0 - 10)     Pain Intensity 1: 0                       Gait:    [x]  Independent gait without any assistive device for community distances  ADLs:  Independent  Treatment Response:  Patient wears her compression daily and arrived with compression garments properly donned today.   []   Patient reviewed packet received at evaluation  []   Patient completed home program as prescribed  []   Patient not fully compliant with home program to date  []   Patient waiting on compression garment arrival  Function: Patient drives herself. []   Patient requiring less assistance with completion of home program  []   ADLs are requiring less assistance   []   Patient able to return to work/leisure activities  [address] Lymphedema Assessment Scale: Not reassessed today. TREATMENT AND OBJECTIVE DATA SUMMARY:   Patient/Family Education:        Instructed in don/doff of compression system:   []   Multi layer bandage (MLB) donning principles and wear precautions  [x]   Day garments-Discussed garment options with patient request to increase her compression level. Also demonstrated the Juzo strong material which is a firmer material than Expert. Patient is opting to continue with the Expert line of garments in a class 3. Reviewed garment ordering process and patient is opting to change vendors to [employer] compression. Reviewed garment 30 day remake policy and that patient should contact therapist upon delivery if she has any concerns about garment fit. [x]   Night garments- discussed garment options and demonstrated the Kinharley Davenport footpiece in response to patient concerns about the Circaid products not offering her enough compression in the ankles. Educated in product donning and doffing. Educated patient in the [address] 30 day remake policy and instructed her to contact therapist immediately if she has any fit concerns upon delivery. Therapeutic Activity 0 minutes   Treatment time: N/A  Functional Mobility: Independent with all activities and transfers performed in the clinic today. Fall risk: Low     Manual Lymphatic Drainage (MLD) 95 minutes   Treatment time: 4067-0481  Area to decongest:    [] UE          []  Right     []  Left      [] Trunk      [x] LE             [x]  Right     [x]  Left      [x] Trunk    Patient has been trained in self manual lymph drainage techniques during previous treatment session. She is skilled in her self care techniques.    Sequence used and effectiveness: [] Secondary/Primary sequence for upper extremity with / without trunk involvement    [] Secondary/Primary sequence for lower extremities with / without trunk involvement     [] Modifications were made to manual lymph drainage sequence to exclude cervical techniques secondary to patient's age    [] Self MLD sequence/techniques/hand strokes   Skin/wound care/debridement: Skin is 100% intact today bilateral lower extremities. Upper/Lower extremity compression: Measured patient for the following custom compression products    Right []   Left []    Bilateral [x]     [] Upper Extremity        []   Sleeve        []  Glove/Gauntlet        []  Trunk             [x]  Lower Extremity        [x]   Knee length        [x]   Thigh length        [x]   Panty      Style  [] Circular knit        [x] Flat-knit  Juzo knee highs with custom christ           [x] Velcro - custom knee length garment and custom knee/thigh piece bilaterally  []  Foam based    [x]  Brand: Stockings- Juzo expert  Velcro- Circaid Juxtafit  [x]  Custom    []  Non-Custom:        Size:    [x] Vendor: [employer] compression    Patient is requesting to be measured for new Juxtafits in addition to the planned flat-knit daytime compression garments. Discussed flat-knit garment fit with patient requesting to try a stronger compression level. Discussed options of changing her material to the MaidaSpaseebo strong or continuing with the Expert line in class 3. Demonstrated the Meredith Murillo for patient. Patient requests to return to the Expert line in class 3 and she is not opting to order any silver garments at this time (her last order was the Expert Silver). Measured patient for Custom Circaid Juxtafits. Discussed previous garments and footpieces. Patient reports she feels the lower leg garments are a little short so slight increase in length was added to the knee length garment measurements today.   Patient did not care for the last ankle wrap that was sent with her order so discussed additional foot options. Patient reports that she did not feel the previous Circaid footpieces achieved enough compression for her at the ankles to demonstrated Anastacia Ground classic foot pieces which would allow her to provide optimal compression in the malleolar region. Donned a sample garment and patient liked the product and requested to be sized for the product. She is sized for a Anastacia Ground classic small long footpiece bilateral.  This request will be sent with her current order. Educated patient in product donning and doffing today. Patient is to call therapist upon garment delivery if there are fit issues. Reviewed the 30 day remake policy with her product companies. Encouraged her to try on the products as soon as they are delivered and notify therapist immediately if she has fit concerns so therapist can notify the vendor. She will return for a formal garment fitting in November. Kinesiotaping: N/A   Girth/Volume measurement: Reassessed girths today. See below. TOTAL TREATMENT 95 mins     Circumferential Limb Measurements:  Full volumes were not reassessed today secondary to patient required extensive measuring for bilateral lower extremity custom day and night compression products. Girths are as follows:  Affected Side: Bilateral lower extremities   Left (cm) Right (cm)   5th Tuberosity  24.2        23.7   Ankle  30.3     30.5      Calf  46.5     47.3      Mid Knee  58.0     61.0      Thigh  74.0     75.3      Groin  80.5      81.0               ASSESSMENT:   Treatment effectiveness and tolerance: Patient returns for her first follow-up visit since evaluation in order to be measured for bilateral lower extremity custom day and night compression garments. Patient will be trying a different footpiece from [facility] as she has not found the Circaid footpieces to be effective enough for her.   She will also be increasing the compression level for her daytime flat-knit garments. An updated plan of care is being established today as patient was delayed in returning to therapy for for her garment measures. Progress toward goals: See goals above. PLAN OF CARE:   Changes to the plan of care: Patient will be fitted for custom day and night compression products upon delivery. Will review garment donning and doffing and garment care upon delivery. Frequency: []  2 times a week  []  Weekly  []  Biweekly  [x]  Monthly   Other: Vendor: [employer] works compression       CHANCE Archuleta, MOMO    TREATMENT PLAN EFFECTIVE DATES:   10/14/19 TO 1/9/19  I have read the above plan of care for [address]. I certify the above prescribed services are required by this patient and are medically necessary. The above plan of care has been developed in conjunction with the lymphedema/physical therapist.   Physician Signature: ____________________________________________________                                      Dr. Kaci Perez  Date: _________________________________________________________________

## 2019-11-18 ENCOUNTER — HOSPITAL ENCOUNTER (OUTPATIENT)
Dept: PHYSICAL THERAPY | Age: 59
Discharge: HOME | End: 2019-11-18
Payer: COMMERCIAL

## 2019-11-18 PROCEDURE — 97140 MANUAL THERAPY 1/> REGIONS: CPT

## 2019-11-18 NOTE — PROGRESS NOTES
[address] [place] [address]  Physical Therapy Lymphedema Clinic  [address] [facility], [address], [facility] 22.    LYmphedema Therapy  Visit: 3    []                  Daily note               [x]    30 day  Progress Note     NAME: Darnell Watkins  DATE: 11/18/2019    GOALS     Patient returns for compression garment fitting. Time frame: 09/20/19 extend to 11/30/19  1. Patient will be measured and fitted for new custom compression flat-knit stockings to manage her chronic lymphedema and prevent reaccumulation of fluid. Patient was measured for custom day and night compression garments for bilateral lower extremities. Measured for Juzo expert christ and knee highs bilaterally and Circaid Juxtafit lower leg and knee/thigh garments. Also measured for [employer] classic footpieces. Juxtafit lower leg garments and farrow foot pieces fit well. Thigh Juxtafits will benefit from some increased length and a remake request will be made. Left leg knee high fits well. Patient is having trouble managing the class 3 christ so will remake the garment in a class 2. The right knee high is also tight in the foot and ankle so a remake with changes will be requested. Extend goal to 12/30/19. .  2.  Patient will be independent with don/doff of compression system and use in order to prevent reaccumulation of fluid at discharge. Educated patient in donning and doffing the [employer] Main classic footpiece and reviewed the process today. Also educated patient in correct Juxtafit donning process which patient had the process reversed. She is independent with product use after education today. Goal met 11/18/19. SUBJECTIVE REPORT:  I got the christ on last night but it is really tight and I cannot pull it up all the way. Also the right ankle was really tight and uncomfortable and the top was sliding down so I think we need to make some changes to it. The left leg is good.    Pain: Patient does report some intermittent left posterior knee and thigh pain that she describes as burning. Addressed her questions about DVT and symptoms and recommended she discuss her symptoms with her doctor that she is seeing this afternoon. Pain Scale 1: Numeric (0 - 10)     Pain Intensity 1: 0                       Gait:    [x]  Independent gait without any assistive device for community distances  ADLs:  Independent  Treatment Response:  Patient wears her compression daily and arrived with compression garments properly donned today. []   Patient reviewed packet received at evaluation  [x]   Patient completed home program as prescribed  []   Patient not fully compliant with home program to date  []   Patient waiting on compression garment arrival  Function: Patient drives herself. []   Patient requiring less assistance with completion of home program  []   ADLs are requiring less assistance   []   Patient able to return to work/leisure activities  [facility] Lymphedema Assessment Scale: Not reassessed today. TREATMENT AND OBJECTIVE DATA SUMMARY:   Patient/Family Education:        Instructed in don/doff of compression system:   []   Multi layer bandage (MLB) donning principles and wear precautions  [x]   Day garments-Discussed garment options with patient request to increase her compression level. Also demonstrated the Juzo strong material which is a firmer material than Expert. Patient is opting to continue with the Expert line of garments in a class 3. Reviewed garment ordering process and patient is opting to change vendors to [employer] compression. Reviewed garment 30 day remake policy and that patient should contact therapist upon delivery if she has any concerns about garment fit. Fitted patient today and discussed and addressed remake concerns for Juzo daytime stockings.       [x]   Night garments- discussed garment options and demonstrated the Farrow footpiece in response to patient concerns about the Circaid products not offering her enough compression in the ankles. Educated in product donning and doffing. Educated patient in the [facility] 30 day remake policy and instructed her to contact therapist immediately if she has any fit concerns upon delivery. Reviewed donning and doffing of Circaid Juxtafit and farrow footpiece today. Changes are needed for the  thigh Juxtafits and remake will be requested. Patient is instructed to call upon garment delivery. Therapeutic Activity 0 minutes   Treatment time: N/A  Functional Mobility: Independent with all activities and transfers performed in the clinic today. Fall risk: Low     Manual Lymphatic Drainage (MLD)  75 minutes   Treatment time: 2332-7723  Area to decongest:    [] UE          []  Right     []  Left      [] Trunk      [x] LE             [x]  Right     [x]  Left      [x] Trunk    Patient has been trained in self manual lymph drainage techniques during previous treatment session. She is skilled in her self care techniques. Sequence used and effectiveness: [] Secondary/Primary sequence for upper extremity with / without trunk involvement    [] Secondary/Primary sequence for lower extremities with / without trunk involvement     [] Modifications were made to manual lymph drainage sequence to exclude cervical techniques secondary to patient's age    [] Self MLD sequence/techniques/hand strokes   Skin/wound care/debridement: Skin is 100% intact today bilateral lower extremities.    Upper/Lower extremity compression: Fitted patient for the following day and nightcustom compression products    Right []   Left []    Bilateral [x]     [] Upper Extremity        []   Sleeve        []  Glove/Gauntlet        []  Trunk             [x]  Lower Extremity        [x]   Knee length        [x]   Thigh length        [x]   Panty      Style  [] Circular knit        [x] Flat-knit  Juzo knee highs with custom christ           [x] Velcro - custom knee length garment and custom knee/thigh piece bilaterally, Farrow foot piece  []  Foam based    [x]  Brand: Stockings- Juzo expert  Velcro- Circaid Juxtafit  [x]  Custom    []  Non-Custom:        Size:    [x] Vendor: [employer] compression    Fitted patient for bilateral lower extremity Juzo compression knee highs with patient reporting the right knee high was uncomfortably tight at the ankle (this is improving) and the end of the garment was sliding down. She reports the left knee high fit fine. She arrived wit Juzo knee highs place. New measurements were taken to the right lower leg garment to adjust fit at the ankle, foot and at the end of the garment. Left leg stocking fits well. Donned the christ with patient with significantly increased effort. The garment is difficult to pull up all the way secondary to it is a class 3. The rest of the fit looks good except for some puckering at the posterior calf seam on the christ. Took pictures of this as well and will send a remake request for the christ and right knee high to [employer] compression. Donned Circaid Juxtafits and Farrow foot pieces. Lower leg juxtafits and farrow footpieces fit patient well. Reviewed donning fo Stefani Moran footpieces via demonstration and verbal instruction. Address patient questions about the process. Donned the thigh length knee thigh piece and reviewed donning process with patient. Garments fit adequately bit patient prefers to have them a little bit longer so new length measurements were taken and therapist will request a remake on bilateral knee/thigh pieces with [employer] compression. Patient left the clinic with the lower leg Juxtafits in place. Patient is to call therapist upon garment delivery to see if remake corrections were effective and will pursue a reorder as needed. Encouraged her to try on the products as soon as they are delivered and notify therapist immediately if she has fit concerns.             Kinesiotaping: N/A   Girth/Volume measurement: Deferred today for garment fitting. TOTAL TREATMENT 75 mins     Circumferential Limb Measurements:  Deferred for lower extremity compression garment fitting today. ASSESSMENT:   Treatment effectiveness and tolerance:  Patient returns for fitting for bilateral lower extremity custom compression garments day and night products. Some fit issues are noted with both the daytime and night time products and remakes will be requested for the problematic garments. Most of the changes are minor bit should improve the fit and patient comfort level. Progress toward goals: See goals above. Short term goal 2 was met. PLAN OF CARE:   Changes to the plan of care: Patient will be fitted for remade custom day and night compression products upon delivery. Frequency: []  2 times a week  []  Weekly  []  Biweekly  [x]  Monthly   Other: Vendor:  [employer] works compression       CHANCE Voss, MOMO

## 2019-11-27 ENCOUNTER — HOSPITAL ENCOUNTER (OUTPATIENT)
Dept: MAMMOGRAPHY | Age: 59
Discharge: HOME | End: 2019-11-27
Attending: OBSTETRICS & GYNECOLOGY
Payer: COMMERCIAL

## 2019-11-27 DIAGNOSIS — Z12.31 VISIT FOR SCREENING MAMMOGRAM: ICD-10-CM

## 2019-11-27 PROCEDURE — 77063 BREAST TOMOSYNTHESIS BI: CPT

## 2019-12-24 ENCOUNTER — APPOINTMENT (OUTPATIENT)
Dept: PHYSICAL THERAPY | Age: 59
End: 2019-12-24

## 2020-02-10 ENCOUNTER — HOSPITAL ENCOUNTER (OUTPATIENT)
Dept: PHYSICAL THERAPY | Age: 60
Discharge: HOME | End: 2020-02-10
Payer: COMMERCIAL

## 2020-02-10 PROCEDURE — 97140 MANUAL THERAPY 1/> REGIONS: CPT

## 2020-08-05 NOTE — PROGRESS NOTES
Lymphedema Physical Therapy Discharge Summary documented 8/5/2020    Patient did not return to therapy after garment remake request was made for the patient. It is unclear if she was able to receive a remake on the product. She was last seen in the clinic on 2/10/2020 and she had a total of 4 visits. Goal 2 was met and goal 1 was in progress.    CHANCE Villarreal, MOMO

## 2020-09-08 ENCOUNTER — APPOINTMENT (OUTPATIENT)
Dept: PHYSICAL THERAPY | Age: 60
End: 2020-09-08
Payer: COMMERCIAL

## 2020-09-22 ENCOUNTER — HOSPITAL ENCOUNTER (OUTPATIENT)
Dept: PHYSICAL THERAPY | Age: 60
Discharge: HOME | End: 2020-09-22
Payer: COMMERCIAL

## 2020-09-22 VITALS — TEMPERATURE: 98.2 F

## 2020-09-22 PROCEDURE — 97161 PT EVAL LOW COMPLEX 20 MIN: CPT

## 2020-09-22 PROCEDURE — 97140 MANUAL THERAPY 1/> REGIONS: CPT

## 2020-09-22 NOTE — PROGRESS NOTES
[facility]  Lymphedema Clinic  [address] [place] [address], [address] [place] [address], [facility].    INITIAL EVALUATION    NAME: Avila Ortega AGE: 63 y.o. GENDER: female  DATE: 9/22/2020  REFERRING PHYSICIAN: Dr. See Contreras  HISTORY AND BACKGROUND:   Primary Diagnosis:  ? Primary lymphedema (Q82.0)  Other Treatment Diagnoses:  ? Abnormality of gait (R26.9)  · Swelling not relieved by elevation (R60.9)  Date of Onset: Referred 3/2017. Patient reports swelling has been present for years since she was young, but she was first diagnosed 11/24/2015. Patient has numerous family members who present with similar swelling symptoms and this strong family history is indicative of primary lymphedema. Patient returns for re-evaluation today. Present Symptoms and Functional Limitations: Patient reports increased swelling since last assessment despite daily compression garment wearing schedule and nightly compression garment wearing. She uses her Flexitouch 3 days a week and performs her lymphedema exercises daily. Patient's activity level has decreased with the Covid 19 pandemic. Patient reports a history of chronic swelling bilateral lower extremities with the right lower extremity being worse than the left  [facility] Lymphedema Assessment Scale: 4/20. Past Medical History:   Past Medical History:   Diagnosis Date    Arthritis     Hypertension     Ill-defined condition     bilateral LE lymphedema     Past Surgical History:   Procedure Laterality Date    HX APPENDECTOMY      HX GYN      endometrial ablasion    HX UROLOGICAL      bladder sling    JADEN BIOPSY BREAST STEREOTACTIC Right     BENIGN     Current Medications:  Patient reports she is no longer taking oxybutynin chloride XL and esomeprazole. Current Outpatient Medications   Medication Sig    potassium chloride (K-DUR, KLOR-CON) 20 mEq tablet Take 20 mEq by mouth two (2) times a day.     metoprolol succinate (TOPROL-XL) 50 mg XL tablet Take 50 mg by mouth daily.  chlorthalidone (HYGROTEN) 50 mg tablet Take 50 mg by mouth daily.  losartan (COZAAR) 100 mg tablet Take 100 mg by mouth daily.  atorvastatin (LIPITOR) 40 mg tablet Take 40 mg by mouth daily.  oxybutynin chloride XL (DITROPAN XL) 15 mg CR tablet Take 30 mg by mouth daily.  amLODIPine (NORVASC) 10 mg tablet Take 10 mg by mouth daily.  cloNIDine HCl (CATAPRES) 0.1 mg tablet Take 0.1 mg by mouth two (2) times a day.  aspirin 81 mg chewable tablet Take 81 mg by mouth daily.  multivit-min-FA-lycopen-lutein (CENTRUM SILVER) 0.4-300-250 mg-mcg-mcg tab Take  by mouth.  omega 3-dha-epa-fish oil (FISH OIL) 100-160-1,000 mg cap Take  by mouth.  Cholecalciferol, Vitamin D3, (VITAMIN D3) 2,000 unit cap capsule Take 2,000 Units by mouth two (2) times a day.  esomeprazole (NEXIUM) 40 mg capsule Take 40 mg by mouth daily. No current facility-administered medications for this encounter. 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 Since the Covid pandemic she works some of the time from home but goes into the office on other days. She is  with 3 adult children  Living Situation: Lives with spouse           Trainable Caregiver?: Yes  Mobility: Independent gait without any assistive device for community distances  Sleeping Arrangement:  in bed  Adaptive Equipment Owned: none  Other: Patient is able to don compression stockings and does have assistance of her spouse when needed.      Previous Therapy:  Patient has received Lymphedema treatment at [facility] lymphedema program in the past with her last appointment in June 2016. She was seen at [facility] Lymphedema program 4/4/2017 - 5/2017, and 11/16/17-1/29/18, 6/26/18-8/9/18, and 6/25/19 to 2/10/2020. Compression/Lymphedema Equipment: Custom flat-knit Juzo compression knee highs bilateral lower extremities and christ pants. She also has full leg bilateral lower extremity Circaid Juxtafits for night use. She also reports using body glue to assist with Juxtafit suspension on her thighs secondary to keep them form slipping down. She consistently performs skin care with Eucerin and lymphedema exercises regularly. She has a Flexitouch which she uses daily. SUBJECTIVE:  My legs are definitely swelling more especially in the last month and since the summer and I am not sure what to do about it. Patients goals for therapy: decrease swelling and to be measured for new compression garments. OBJECTIVE DATA SUMMARY:   EXAMINATION/PRESENTATION/DECISION MAKING:   Pain:   Patient reports pain in her legs with increased swelling. Pain Scale 1: Numeric (0 - 10)  Pain Intensity 1: 5  Pain Location 1: Leg  Pain Orientation 1: Left;Right  Pain Description 1: Aching    Self-Care and ADLs: Independent    Skin and Tissue Assessment:  Dermal Status: Intact and patient has an area distal to the right knee that looks like a flattened scab, patient is unsure what caused it    Texture/Consistency: Boggy     Pigmentation/Color Change: Hyperpigmented    Anomalies: N/A    Circulatory: Vascular studies ruled out DVT in past    Nails: Normal    Stemmers Sign: Positive    Height:    5/2' Weight:    260lbs  BMI:     (36 or greater: adversely affecting lymphedema)  Wound/Ulcer:  N/A        Volumetric Measurements:   Right:  15,701.32mL (increased by 989.66ml since 2/10/2020) Left:  14,825.00mL (increased by 880. 17ml since 2/10/2020)   % Difference: 5.91% right larger than left versus 5.50% larger 2/10/2020 Dominance:    (See scanned graph)  Range of Motion: [facility] for bilateral lower extremities  Strength: Not formally assessed 2* patient is able to complete all functional activities in the clinic today. Sensation:  Intact     Mobility:    Bed/Chair Mobility: Independent  Transfers: Independent  Gait: Independent  Endurance: good       Safety:  Patient is alert and oriented: Yes  Safety awareness: good  Fall risk?: Low risk, she does not report a history of falls  Patient given written fall prevention handout: Yes     Based on the above components, the patient evaluation is determined to be of the following complexity level: LOW     Evaluation Time: 6241-0650 30 minutes    TREATMENT PROVIDED:   1. Treatment description:  Manual Therapy:  Patient returns to therapy for assessment of her swelling status with increases noted in her thigh swelling. Patient is reporting decreased activity related to Covid 19 pandemic work changes. She wears her custom compression flat knit stockings daily and these are overdue for replacement. Reviewed garment lifespan with patient and discussed that garments past their lifespan no longer promote optimal swelling control. Patient also reports she thinks her Circaid Juxtafits need to be replaced. Discussed that she may benefit from foam based night compression garments which would allow her to overbandage and are in general more aggressive for night wear compression than her current juxtafits. Patient likes the idea of changing these products and reports she has been frustrated by the velcro straps and the thigh highs not staying up as well. Discussed and demonstrated night compression garment options including Seth cheryl and Solaris Tribute and discussed style options for these products including thigh highs, chirst and knee highs. Educated patient in garment use and the basics of the wearing schedule as well as garment lifespan.   Patient is opting for Tribute in thigh length with the potential to order a bike short if needed in the future. Therapist proceeded to measure patient for custom thigh length solaris tributes with outer jackets in patient preferred color of black. Followed with measuring for bilateral lower extremity custom Juzo flat-knit expert knee highs with christ in compression class 2. Added length to posterior aspect of panty secondary to patient reporting they ride down a little bit. Order will be sent to patient's preferred vendor of [employer] compression. Treatment time:  2281-0477  Minutes: 70    ASSESSMENT:   Lotus MANUEL Ten Silvestre is a 63 y.o. female who presents with bilateral lower extremity primary lymphedema. Patient had been following her home program diligently. She is using all of her self management tools but her limb volumes are elevated bilaterally. Her current daytime flat-knit compression garments and night-time velcro style compression garments are due to be replaced in the near future but patient is opting to work on decreasing her swelling prior to being measured for her products. She may benefit from changing her night compression products to a foam based garment. Her condition is complicated by arthritis and HTN. Patient is motivated to improve her condition and is seeking to expand her compression to include foam based Solaris Tributes bilaterally. Patient will benefit from complete decongestive therapy (CDT) including: Manual lymphatic drainage (MLD) technique and Short-stretch textile bandages/compression system to decongest limb as appropriate. This care is medically necessary due to the infection risk with lymphedema and to improve functional activities. CDT is necessary to resolve swelling to allow patient to return to wearing normal clothes/footwear and prevent worsening of symptoms, such as infections and/or hospitalizations.  Patient will be independent with home program strategies to allow improved ADL ability and mobility and to allow patient to return to greatest functional independence. Rehabilitation potential is considered to be Good. Factors which may influence rehabilitation potential include patient is compliant with home program.  Patient will benefit from 2-5 physical therapy visits over 10-12 weeks to optimize improvement in these areas. PLAN OF CARE:   Recommendations and Planned Interventions: Manual lymph drainage/decongestive therapy, Multi-layer compression bandaging (short-stretch), Compression garment fitting/provision, Education in skin care and lymphedema precautions and Self-bandaging education per home program    GOALS  Short term goals  Time frame: 10/30/2020  1. Patient will be fitted for new foam based night compression garment and she will be educated in product use and wearing schedule. 2.  Patient will be educated in overbandaging technique for her new night Solaris tribute as an option to increase her compression at night. Long term goals  Time frame: 12/18/2020  1. Patient will be independent with don/doff of compression system and use in order to prevent reaccumulation of fluid at discharge. 2.  Pt will be independent in self-MLD and show stable limb volumes showing decongestion and pt. ready for transition to independent restorative phase of lymphedema therapy. Patient has participated in goal setting and agrees to work toward plan of care. Patient was instructed to call if questions or concerns arise. Thank you for this referral.   CHANCE Zazueta, CLROB-GABY   Time Calculation: 100 mins    TREATMENT PLAN EFFECTIVE DATES:   9/22/2020 TO 12/18/2020  I have read the above plan of care for [address] Anum Lind. I certify the above prescribed services are required by this patient and are medically necessary.   The above plan of care has been developed in conjunction with the lymphedema/physical therapist.       Physician Signature: ____________________________________Date:______________ Dr. Elvin Hall

## 2020-10-22 ENCOUNTER — HOSPITAL ENCOUNTER (OUTPATIENT)
Dept: PHYSICAL THERAPY | Age: 60
Discharge: HOME | End: 2020-10-22
Payer: COMMERCIAL

## 2020-10-22 VITALS — TEMPERATURE: 97.5 F

## 2020-10-22 PROCEDURE — 97140 MANUAL THERAPY 1/> REGIONS: CPT

## 2020-10-22 NOTE — PROGRESS NOTES
[facility]  Lymphedema Clinic  [address] [facility], [address] [facility]    LYMPHEDEMA THERAPY  VISIT: 2    [x]                  Daily note               []                 30 day/10th visit progress note    NAME: Lakeshia Cherry  DATE: 10/22/2020    GOALS  Short term goals  Time frame: 10/30/2020  1. Patient will be fitted for new foam based night compression garment and she will be educated in product use and wearing schedule. Fitted patient for the garment but her leg sizes have changed and the garment is too loose in the thighs to stay up. Will request a remake versus alterations if company permits. Extend goal to 11/30/2020.  2.  Patient will be educated in overbandaging technique for her new night Solaris tribute as an option to increase her compression at night. Educated patient in how to over bandage with her [facility] [address] which will help to increase her compression options at night. Demonstrated the technique for patient and repeat verbalized the techniques. She declined to practice today. Extend goal to 11/30/2020.     Long term goals  Time frame: 12/18/2020  1. Patient will be independent with don/doff of compression system and use in order to prevent reaccumulation of fluid at discharge. Patient was able to don and doff all products in the clinic today but since remakes/alterations of products are required, will reassess donning and doffing once new garments arrive. 2.  Pt will be independent in self-MLD and show stable limb volumes showing decongestion and pt. ready for transition to independent restorative phase of lymphedema therapy. Patient's measurements are decreased for her compression garments bilaterally today. SUBJECTIVE REPORT: The knee highs seemed like they went on too easy and the christ was sent without the waist band so I haven't tried the christ on.   The foam garments are much easier to use at night but they keep sliding down my thighs. I think they helped bring my legs down and I like them better than the velcro garments. Temperature: 97.5 degrees    Pain:  Pain Scale 1: Numeric (0 - 10)  Pain Intensity 1: 0  Pain Location 1: Leg  Pain Orientation 1: Left;Right    Gait:  Independent gait without any assistive device for community distances  Transfers: Sit to stand with Independent using no assistive devices. Fall risk: low Low risk, she does not report a history of falls    ADLs: Independent with bathing and dressing. Treatment Response:  Patient completed home program as prescribed. Patient brought in all required supplies for treatment. [facility] Lymphedema Assessment Scale:  deferred  TREATMENT AND OBJECTIVE DATA SUMMARY:     Therapeutic Exercise/Procedure 0 minutes   Treatment time: N/A  Walking program N/A and Patient encouraged to participate in a walking program up to 25 minutes per day as tolerated. [employer] exercise program: Patient has been educated in [employer] exercises during past treatment sessions   Stick exercise program: N/A    Free exercises/ROM: Patient has been educated in remedial home exercise program in the past   Home program: Patient to perform daily to BID:  Skin care, Deep abdominal breathing, Exercise routine, Walking program, Rest in supine, Compression garments, Vasopneumatic device, Wound care and Bring supplies to each therapy visit   Rationale: Exercise will increase the lymph angiomotoricity and tissue pressure of the skin and thus decrease swelling. Modalities 0 minutes   Treatment time: N/A  Vasopneumatic pump: Vasopneumatic pump is in place for home use. Patient is using pump per recommendations at home. Manual Lymphatic Drainage (MLD) 75 minutes   Treatment time: 0054-2176  Patient/family education provided in self MLD.  Patient has been educated in self manual lymph drainage during past treatment sessions   Area to decongest: LE: bilateral Sequence used and effectiveness: Deferred for compression garment fitting and education today   Skin/wound care/debridement: Reviewed skin care principles:   Skin is 100% intact. Patient is performing good skin care at home   Applied multi-layer compression bandaging to: Educated patient in how to over bandage on her new solaris tribute thigh high garments for night compression. Demonstrated the technique for her and with verbal instruction as well. left  lower extremity: thigh high    The following multi-layer bandages were applied:    Foam:   Solaris tribute    Short stretch bandages:   8 cm  10 cm  12 cm    Education:   Patient/caregiver in multi-layer bandaging donning principles. and Precautions. Patient declined practicing in the clinic today. Upper/Lower extremity compression: Fitted for and Measured for the following compression products:    LE: bilateral bilateral lower extremity: Knee high Thigh high Anushka    Style:   Flat-knit and Foam based    Brand:  ORDISSIMO    Type:  Custom: Juzo expert anushka and knee highs and custom Solaris thigh high night garments    Vendor: [employer]    Education:  Educated patient in compression garment donning and doffing. Daily wear schedule. Daily laundering. Garment lifespan. Return and reordering process (by bringing prior garments into the clinic). Assessed fit of current products and took new measurements for bilateral knee highs. Patient feels the garment material is too thin and light (she has used this style in the past but feels the material used by this company may have changed). Demonstrated the stronger flat-knit Juzo strong material and she requests to try that material in the remakes. The Bella So will not be made with size changes but will be remade with the strong material per patient request.  The anushka that she received was sent without the adjustable waistband that had been requested for the order.     The solaris tributes were assessed for fit and they were too larger in the thighs. Therapist clipped and marked the garments to show the excess material that needs to be removed. Therapist also took new circumferences and a remake/alteration request will be sent to vendor to determine which option they will perform for patient. Patient will return for a fitting when products are delivered. Patient/family demonstrated donning and doffing with independence   Kinesiotaping: N/A   Girth/Volume measurement: Reviewed results of volumetric measurements taken on evaluation. Not formally assessed today secondary to measuring for remakes of her compression garments today. Her leg measurements for both legs were decreased throughout the upper and lower legs today. Height:     Weight:      BMI:     (36 or greater: adversely affecting lymphedema)    ASSESSMENT:   Patient is diligent with her home program and she initiated product use prior to coming to therapy for a fitting but she notes her garments felt loose and reassessment of compression garment measurement yields decreases in her leg sizes bilaterally. Remakes of the Juzo compression garments will be requested in the mobileo strong material with the new circumferences. The [employer] for night use seems to have helped reduce her limb volumes and patient likes them but they are too loose up in the thighs and patient reports they slide down her legs when she is walking. Remakes or alterations will be requested and will depend on [employer] decision as to which they will do. She was educated in how to apply multi-layer compression bandaging over top of the [place] today. Patient remains motivated and continues to do well in above home programs. Patient is eager to learn and improve on condition. Patient is making gains towards goals. PLAN OF CARE:   Continue plan of care as established on evaluation.    Frequency:  one visit every 3-6 weeks dependent on compression garment delivery   Next session will address: Assess volumes  Garment fitting  Education      Other:  N/A       TOTAL TREATMENT 75 mins    Barbara Kim MSPT, CLROB-GABY

## 2020-11-09 ENCOUNTER — TRANSCRIBE ORDER (OUTPATIENT)
Dept: SCHEDULING | Age: 60
End: 2020-11-09

## 2020-11-09 DIAGNOSIS — Z12.31 VISIT FOR SCREENING MAMMOGRAM: Primary | ICD-10-CM

## 2020-11-30 ENCOUNTER — APPOINTMENT (OUTPATIENT)
Dept: PHYSICAL THERAPY | Age: 60
End: 2020-11-30
Payer: COMMERCIAL

## 2020-12-01 ENCOUNTER — HOSPITAL ENCOUNTER (OUTPATIENT)
Dept: PHYSICAL THERAPY | Age: 60
Discharge: HOME | End: 2020-12-01
Payer: COMMERCIAL

## 2020-12-01 VITALS — TEMPERATURE: 95 F

## 2020-12-01 PROCEDURE — 97140 MANUAL THERAPY 1/> REGIONS: CPT

## 2020-12-01 NOTE — PROGRESS NOTES
[facility]  [address], [address] [facility]    LYMPHEDEMA THERAPY  VISIT: 3    []                  Daily note               [x]                 30 day/10th visit progress note updated plan of care  NAME: Sofia Hong  DATE: 12/1/2020    GOALS  Short term goals  Time frame: 10/30/2020  1. Patient will be fitted for new foam based night compression garment and she will be educated in product use and wearing schedule. Patient was fitted for remade Solaris garment but garment continues to slide down and is too loose in the proximal thighs. A second adjustment will be requested and the garment will be returned for alteration. Extend goal to 1/31/2021.  2.  Patient will be educated in overbandaging technique for her new night Solaris tribute as an option to increase her compression at night. Educated patient in how to over bandage with her [facility] [address] which will help to increase her compression options at night. Demonstrated the technique for patient and repeat verbalized the techniques. She declined to practice last session and this was not reviewed today. Extend goal to 1/31/2021.     Long term goals  Time frame: 12/18/2020  1. Patient will be independent with don/doff of compression system and use in order to prevent reaccumulation of fluid at discharge. Patient was able to don and doff all products in the clinic today and she is correctly wearing her Juzo strong christ and knee highs. Goal is met 12/1/2020.  2.  Pt will be independent in self-MLD and show stable limb volumes showing decongestion and pt. ready for transition to independent restorative phase of lymphedema therapy. Patient's limb volumes are decreased bilaterally today and she is knowledgeable of self manual lymph drainage techniques. Her daytime garments fit well but we need additional alterations on her night products. Extend goal to 1/31/2021. SUBJECTIVE REPORT:  The stockings are good and I like this thicker material better. I like the night garments but they are sliding down when I wear them and I am having trouble getting them up all the way. I just got them on Wednesday. Temperature: 95.0 degrees    Pain:  Pain Scale 1: Numeric (0 - 10)  Pain Intensity 1: 0  Pain Location 1: Leg  Pain Orientation 1: Left;Right    Gait:  Independent gait without any assistive device for community distances  Transfers: Sit to stand with Independent using no assistive devices. Fall risk: low Low risk, she does not report a history of falls    ADLs: Independent with bathing and dressing. Treatment Response:  Patient completed home program as prescribed. Patient brought in all required supplies for treatment. Patient received all remade day and night compression garments. [facility] Lymphedema Assessment Scale:  deferred  TREATMENT AND OBJECTIVE DATA SUMMARY:     Therapeutic Exercise/Procedure 0 minutes   Treatment time: N/A  Walking program N/A and Patient encouraged to participate in a walking program up to 25 minutes per day as tolerated. AMSC exercise program: Patient has been educated in [employer] exercises during past treatment sessions   Stick exercise program: N/A    Free exercises/ROM: Patient has been educated in remedial home exercise program in the past   Home program: Patient to perform daily to BID:  Skin care, Deep abdominal breathing, Exercise routine, Walking program, Rest in supine, Compression bandage, Compression garments, Vasopneumatic device, Wound care, Self manual lymph drainage (MLD) and Bring supplies to each therapy visit   Rationale: Exercise will increase the lymph angiomotoricity and tissue pressure of the skin and thus decrease swelling. Modalities 0 minutes   Treatment time: N/A  Vasopneumatic pump: Vasopneumatic pump is in place for home use. Patient is using pump per recommendations at home. Manual Lymphatic Drainage (MLD) 75 minutes   Treatment time: 3271-9014  Patient/family education provided in self MLD. Patient has been educated in self manual lymph drainage during past treatment sessions   Area to decongest: LE: bilateral   Sequence used and effectiveness: Deferred for compression garment fitting and education today   Skin/wound care/debridement: Reviewed skin care principles:   Skin is 100% intact. Patient is performing good skin care at home   Applied multi-layer compression bandaging to: Educated patient in how to over bandage on her new solaris tribute thigh high garments for night compression last session. This was not reviewed today. left  lower extremity: thigh high    The following multi-layer bandages were applied:    Foam:   Solaris tribute    Short stretch bandages:   8 cm  10 cm  12 cm    Education:   Patient/caregiver in multi-layer bandaging donning principles. and Precautions. Patient declined practicing in the clinic today. Upper/Lower extremity compression: Fitted for and Measured for the following compression products:    LE: bilateral bilateral lower extremity: Knee high Thigh high Anushka    Style:   Flat-knit and Foam based    Brand:  [employer]    Type:  Custom: [employer] expert anushka and knee highs and custom Solaris thigh high night garments    Vendor: [employer]    Education:  Educated patient in compression garment donning and doffing. Daily wear schedule. Daily laundering. Garment lifespan. Return and reordering process (by bringing prior garments into the clinic). Assessed fit of current products and reassessed circumferences for a reorder of her stockings due to some size decreases. Her stockings fit well otherwise and patient likes the [employer] strong material.  Discussed a reorder and patient is agreeable to having the anushka and knee high reordered in black. She requests to have a slight increase in the posterior panty length.  Order will be placed with [employer] compression. The solaris tributes were assessed for fit and they are still too large in the mid and proximal thighs. Patient reports she likes them but they slide down to mid thigh when she walks in them. Therapist clipped and marked the garments to show the excess material that still needs to be removed. Also discussed that they do not have any other options to improve suspension other than adding a bike short to her garment use. Discussed her body/leg shape which along with the garment being too loose is most likely contributing to the garment slipping down and discussed how products like shape wear bike shorts can help to hold the thigh portion of the garment up better. Also discussed the possibility of ordering a Solaris Tribute bike short that patient can wear with the thigh tributes. Patient expresses interest in trying a shapewear bike short and she will look for a pair to use with the altered garments. Patient will return for a fitting when products are delivered. Patient demonstrated donning and doffing of all products with independence in the clinic today. Kinesiotaping: N/A   Girth/Volume measurement: Reviewed results of volumetric measurements taken on evaluation. -right lower 14,916.09 ml today compared to 15,701.32 ml on 9/22/2020.     -left lower  14,650.55 ml today compared to 14,825.00 ml on 9/22/2020. Percent difference today is 1.81% as compared to 5.91% right larger than left on evaluation. Height:     Weight:      BMI:     (36 or greater: adversely affecting lymphedema)    ASSESSMENT:   Patient is diligent with her home program and she initiated product use prior to coming to therapy for a fitting but she notes her altered new Solaris Tribute night garments are sliding down to mid thigh when she wears them. Assessment of product field yields that the garments are still too loose in the proximal thigh.   When pulled up to full length they are coming up to the gluteal fold but they do not stay there. Will request additional alterations. Also discussed with patient that she may need to try some shapewear bike shorts over top to improve garment suspension. The daytime stockings fit well and patient likes the strong knit of this Juzo garment. Patient remains motivated and continues to do well in above home programs. Patient is eager to learn and improve on condition. Patient is making gains towards goals. PLAN OF CARE:   Continue plan of care as established on evaluation. and Will extend plan of care until 2/28/2020 to allow for garments to be altered and returned to patient for fitting. .  Frequency:  one visit every 3-6 weeks dependent on compression garment delivery   Next session will address: Assess volumes  Garment fitting  Education      Other:  N/A       TOTAL TREATMENT 75 mins    CHANCE Nj, MOMO    TREATMENT PLAN EFFECTIVE DATES:   12/1/2020 TO 2/28/2020  I have read the above plan of care for Kapil [place] I Steve. Rick Sneed. I certify the above prescribed services are required by this patient and are medically necessary.   The above plan of care has been developed in conjunction with the lymphedema/physical therapist.   Physician Signature: ____________________________________________________                                     Dr. Kenya Houston  Date: _________________________________________________________________

## 2020-12-22 ENCOUNTER — APPOINTMENT (OUTPATIENT)
Dept: PHYSICAL THERAPY | Age: 60
End: 2020-12-22
Payer: COMMERCIAL

## 2021-01-04 ENCOUNTER — APPOINTMENT (OUTPATIENT)
Dept: PHYSICAL THERAPY | Age: 61
End: 2021-01-04

## 2021-01-06 ENCOUNTER — HOSPITAL ENCOUNTER (OUTPATIENT)
Dept: MAMMOGRAPHY | Age: 61
Discharge: HOME | End: 2021-01-06
Attending: OBSTETRICS & GYNECOLOGY
Payer: COMMERCIAL

## 2021-01-06 DIAGNOSIS — Z12.31 VISIT FOR SCREENING MAMMOGRAM: ICD-10-CM

## 2021-01-06 PROCEDURE — 77063 BREAST TOMOSYNTHESIS BI: CPT

## 2021-06-02 ENCOUNTER — HOSPITAL ENCOUNTER (OUTPATIENT)
Dept: PHYSICAL THERAPY | Age: 61
Discharge: HOME | End: 2021-06-02
Payer: COMMERCIAL

## 2021-06-02 VITALS — HEIGHT: 62 IN | BODY MASS INDEX: 45.43 KG/M2

## 2021-06-02 PROCEDURE — 97161 PT EVAL LOW COMPLEX 20 MIN: CPT

## 2021-06-02 PROCEDURE — 97140 MANUAL THERAPY 1/> REGIONS: CPT

## 2021-06-02 NOTE — PROGRESS NOTES
[address] [place] [address]  Lymphedema Clinic  [address] [facility], [address], [place]  [address].    INITIAL EVALUATION    NAME: Clement Ortega AGE: 57 y.o. GENDER: female  DATE: 6/2/2021  REFERRING PHYSICIAN: Dr. Michael Turner  HISTORY AND BACKGROUND:   Primary Diagnosis:  ? Primary lymphedema (Q82.0)  Other Treatment Diagnoses:  ? Abnormality of gait (R26.9)  · Swelling not relieved by elevation (R60.9)  Date of Onset: Referred 3/2017. Patient reports swelling has been present for years since she was young, but she was first diagnosed 11/24/2015. Patient has numerous family members who present with similar swelling symptoms and this strong family history is indicative of primary lymphedema. Patient returns for re-evaluation today. Present Symptoms and Functional Limitations: Patient wears day and night compression daily. She reports some irritation from the [address] [address] in her groin area so she has been wearing the Expert christ more often and this is more comfortable to her. She reports she had some new pain in her thighs that she describes as soreness and muscle cramps. Patient reports that she had not been able to use her Flexitouch for a few weeks due to she lost a piece of the machine. She found the missing piece and resumed daily Flexitouch use. Her pain seems to have improved with Flexitouch use but is still present. Patient received her altered night garments and reports a good fit.        Patient reports a history of chronic swelling bilateral lower extremities with the right lower extremity being worse than the left  Lymphedema Life Impact Scale:  Scores 22 out of 64 with 34% impairment  Past Medical History:   Past Medical History:   Diagnosis Date    Arthritis     Hypertension     Ill-defined condition     bilateral LE lymphedema    Menopause      Past Surgical History:   Procedure Laterality Date    HX APPENDECTOMY      HX GYN endometrial ablasion    HX UROLOGICAL      bladder sling    JADEN BIOPSY BREAST STEREOTACTIC Right     BENIGN     Current Medications:  Patient reports she is no longer taking oxybutynin chloride XL and esomeprazole. Patient reports she is also taking trospium CL 20mg, Meloxicam 15mg    Current Outpatient Medications   Medication Sig    potassium chloride (K-DUR, KLOR-CON) 20 mEq tablet Take 20 mEq by mouth two (2) times a day.  metoprolol succinate (TOPROL-XL) 50 mg XL tablet Take 50 mg by mouth daily.  chlorthalidone (HYGROTEN) 50 mg tablet Take 50 mg by mouth daily.  losartan (COZAAR) 100 mg tablet Take 100 mg by mouth daily.  atorvastatin (LIPITOR) 40 mg tablet Take 40 mg by mouth daily.  oxybutynin chloride XL (DITROPAN XL) 15 mg CR tablet Take 30 mg by mouth daily.  amLODIPine (NORVASC) 10 mg tablet Take 10 mg by mouth daily.  cloNIDine HCl (CATAPRES) 0.1 mg tablet Take 0.1 mg by mouth two (2) times a day.  aspirin 81 mg chewable tablet Take 81 mg by mouth daily.  multivit-min-FA-lycopen-lutein (CENTRUM SILVER) 0.4-300-250 mg-mcg-mcg tab Take  by mouth.  omega 3-dha-epa-fish oil (FISH OIL) 100-160-1,000 mg cap Take  by mouth.  Cholecalciferol, Vitamin D3, (VITAMIN D3) 2,000 unit cap capsule Take 2,000 Units by mouth two (2) times a day.  esomeprazole (NEXIUM) 40 mg capsule Take 40 mg by mouth daily. No current facility-administered medications for this encounter. 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 Since the Covid pandemic she works 2 days in the office and 3 days at home.   She is  with 3 adult children  Living Situation: Lives with spouse           Trainable Caregiver?: Yes  Mobility: Independent gait without any assistive device for community distances  Sleeping Arrangement:  in bed  Adaptive Equipment Owned: none  Other: Patient is able to don compression stockings and does have assistance of her spouse when needed. Previous Therapy:  Patient has received Lymphedema treatment at [address] [place] [address] lymphedema program in the past with her last appointment in June 2016. She was seen at [facility] Lymphedema program 4/4/2017 - 5/2017, and 11/16/17-1/29/18, 6/26/18-8/9/18, and 6/25/19 to 2/10/2020 with last appointment 12/1/2020. Compression/Lymphedema Equipment: Custom flat-knit Juzo compression knee highs bilateral lower extremities and christ pants. Solaris Tribute bilateral lower extremities thigh length. She also has used full leg bilateral lower extremity Circaid Juxtafits for night use. She also reports using body glue to assist with Juxtafit suspension on her thighs secondary to keep them form slipping down. She consistently performs skin care with Eucerin and lymphedema exercises regularly. She has a Flexitouch which she uses daily. SUBJECTIVE:  I have been having this new pain and it was worse about 3 weeks ago but has been better lately. I am still having muscle cramps though. Patients goals for therapy: decrease swelling and to be measured for new compression garments. OBJECTIVE DATA SUMMARY:   EXAMINATION/PRESENTATION/DECISION MAKING:   Pain:   Patient reports soreness in her thighs and occasional muscle cramps.     Pain Scale 1: Numeric (0 - 10)  Pain Intensity 1: 3  Pain Location 1: Leg  Pain Orientation 1: Left;Right    Self-Care and ADLs: Independent    Skin and Tissue Assessment:  Dermal Status: Intact    Texture/Consistency: Boggy     Pigmentation/Color Change: Hyperpigmented    Anomalies: N/A    Circulatory: Vascular studies ruled out DVT in past    Nails: Normal    Stemmers Sign: Positive    Height:  Height: 5' 2\" (157.5 cm) 5/2' Weight:    260lbs  BMI:     (36 or greater: adversely affecting lymphedema)  Wound/Ulcer:  N/A        Volumetric Measurements:   Right:  15,072.87mL (increased by 156.78ml since 12/1/2020) Left:  14,499.96mL (decreased by 150.59ml since 12/1/2020)   % Difference: 3.95% right larger than left versus 1.81% larger 12/1/2020 Dominance: N/A   (See scanned graph)  Range of Motion: [facility] for bilateral lower extremities  Strength: Not formally assessed 2* patient is able to complete all functional activities in the clinic today. Sensation:  Intact     Mobility:    Bed/Chair Mobility: Independent  Transfers: Independent  Gait: Independent  Endurance: good     Safety:  Patient is alert and oriented: Yes  Safety awareness: good  Fall risk?: Low risk, she does not report a history of falls  Patient given written fall prevention handout: Yes     Based on the above components, the patient evaluation is determined to be of the following complexity level: LOW     Evaluation Time: 3258-6282 30 minutes    TREATMENT PROVIDED:   1. Treatment description:  Manual Therapy:  Reviewed skin care with low pH lotion and patient is using Eucerin daily. Reviewed lotion application following manual lymph drainage techniques with demonstration by therapist and redemonstration by patient. Patient returns to therapy for assessment of her swelling status which is essentially stable on reassessment. She is reporting recent episodes of lower extremity pain but it has been improving. The increased pain seems to correspond to patient's Flexitouch being out of commission due to a missing component. Patient reports they located the missing piece and she resumed Flexitouch use last week. She does also report muscle spasms in her thighs.    She wears her custom compression flat knit stockings daily and she reports the last set of the Lennie Opal started to rub her posterior inner thigh and created what may have been a blister. As a result she has returned to wearing her older custom Juzo expert christ due to it provided increased comfort. Assessment of the location and the strong Chikia Iva today yields that this may have been occurring under one of the seams on the christ garment. Discussed compression garment options for the custom products and patient is opting to return to ordering the [address] flat-knit Joel Palacios in compression class 2 due to the issue reported above, but she is opting to have the knee highs continue to be the [facility] furniture.  Proceeded to measure patient for custom Juzo expert christ with border and adjustable waistband and for custom bilateral lower extremity Juzo strong knee highs in compression class 2. Order will be sent to patient's preferred vendor of [employer] compression. Discussed alternative options for christ due to patient's ongoing leg soreness and presentation similar to lipidema. Discussed symptoms of lipidema syndrome. Demonstrated micro massaging compression garment sample from [employer] and discussed a similar product from [employer]. Demonstrated donning and doffing and garment material.  Patient would like to try this product and is opting to privately purchase this non custom garment. Sized her for a 3X bioflect christ and printed up the information for patient to order this herself. Patient expresses excitement to try this product. Patient reports her night compression garments fit better after the second alteration and she finds them to be much easier to don than the Circaid Juxtafits. No changes are needed to this product at this time. Treatment time:  9050-4958 Minutes: 70    ASSESSMENT:   Lotus MANUEL Lupe Lopez is a 57 y.o. female who presents with bilateral lower extremity primary lymphedema.   Patient has been following her home program diligently with the exception of her not being able to use her Flexitouch for several weeks to a missing product component. Her current daytime flat-knit compression garments are due to be replaced and patient will also try a micromassaging style of garment. Her condition is complicated by arthritis and HTN. Patient is motivated to improve her condition and is seeking to expand her compression to include foam based Solaris Tributes bilaterally. Patient will benefit from complete decongestive therapy (CDT) including: Manual lymphatic drainage (MLD) technique and Short-stretch textile bandages/compression system to decongest limb as appropriate. This care is medically necessary due to the infection risk with lymphedema and to improve functional activities. CDT is necessary to resolve swelling to allow patient to return to wearing normal clothes/footwear and prevent worsening of symptoms, such as infections and/or hospitalizations. Patient will be independent with home program strategies to allow improved ADL ability and mobility and to allow patient to return to greatest functional independence. Rehabilitation potential is considered to be Good. Factors which may influence rehabilitation potential include patient is compliant with home program.  Patient will benefit from 2-5 physical therapy visits over 10-12 weeks to optimize improvement in these areas. PLAN OF CARE:   Recommendations and Planned Interventions: Manual lymph drainage/decongestive therapy, Multi-layer compression bandaging (short-stretch), Compression garment fitting/provision, Education in skin care and lymphedema precautions and Self-bandaging education per home program    GOALS  Short term goals  Time frame: 7/30/2021  1. Patient will be fitted for new micro massaging compression garment and she will be educated in product use and wearing schedule  1.   Patient will be independent with don/doff of all daytime compression garments and she will be independent in product use in order to prevent reaccumulation of fluid at discharge. 2.  Pt will show stable limb volumes showing decongestion and pt. ready for transition to independent restorative phase of lymphedema therapy. Patient has participated in goal setting and agrees to work toward plan of care. Patient was instructed to call if questions or concerns arise. Thank you for this referral.   CHANCE Paz, T-GABY   Time Calculation: 100 mins    TREATMENT PLAN EFFECTIVE DATES:   6/2/2021 TO 8/28/2021  I have read the above plan of care for [address] Simmons. I certify the above prescribed services are required by this patient and are medically necessary.   The above plan of care has been developed in conjunction with the lymphedema/physical therapist.       Physician Signature: ____________________________________Date:______________                                            Dr. Oksana Gutierrez

## 2021-06-03 NOTE — PROGRESS NOTES
Statement of Medical Necessity  Date: 6/3/2021 MERVAT: Lifetime   ME:  TBD Refills: 2           Diagnosis  []   I97.2 Post-Mastectomy Lymphedema []   I87.2 Venous Insufficiency   []   I89.0 Lymphedema, other secondary  []   I83.019 Venous Stasis Ulcer LE, Right   []   I89.9 Unspecified Lymphatic Disorder []   I83.029 Venous Stasis Ulcer LE, Left   [x]   R60.9 Swelling not relieved by elevation [x]   Q82.0 Hereditary/ Congenital Lymphedema   []   C50.211 Malignant neoplasm of breast, Right []   G89.3  Cancer associated pain   []   C50.212 Malignant neoplasm of breast, Left []   L03.115 LE Cellulitis, Right   []    []   L03.116 LE Cellulitis, Left     Recommended Product for Diagnosis as noted above:  Units Upper Extremity Rt Lt Units Lower Extremity Rt Lt    Compression Multi-Layered Bandages    Compression Multi-Layered Bandages      Circ-Aid, Ready Wrap, Sigvaris Arm    Inelastic binders (Circ-Aid, Farrow)  []Foot   []Below Knee   []Knee   []Thigh      Circ-Aid Ready Wrap, Sigvaris hand    Seth Reginald, Leg, night use      Tribute Arm, night use    Tribute Leg, night use      Seth Reginald Arm, night use    Vance Sleeve Leg/ Foot, night use      Vasopneumatic Compression Pump  []Arm []Arm w/Shoulder  []Arm w/Vest    Vasopneumatic Compression Pump  []Leg         []Trunk       []Pant      Gradiant Compression Sleeves & Gloves  Custom/ RM Arm:    []CCL1 []CCL2 []CCL3  Custom/ RM Glove: []CCL1 []CCL2 []CCL3     6 Gradient Compression Stockings  Custom/ RM Lower Extremity:   []CCL1       [x]CCL2         []CCL3   [x]Knee      []Thigh        [x]Full Length/christ x x    Vance sleeve arm w/ hand, night use    Tribute Wrap, night use      Compression Bra    Compression Vest          Compression Multi-Layered Bandages     The above patient was referred for treatment of Lymphedema due to the diagnosis indicated above. Lymphedema is a progressive and chronic condition.   It may cause acute infections, muscle atrophy, discomfort, and connective tissue fibrosis with irreversible tissue damage, decreased ROM in the affected limb and diminished functional mobility possibly interfering with independence and ability to work. Recurrent infections and wound complications that commonly occur with Lymphedema often require hospitalization and extensive wound care, thus increasing medical costs. The patient has received complete decongestive therapy which includes manual lymphatic drainage, lymphedema specific exercises, compression bandaging, and hygiene/skin care. Goals of therapy are to reduce the edema and prevent re-accumulation of fluid with its complications. It is medically necessary for this patient to have daytime garments to control this condition. They will need 2 sets (one set to wear and one set to wash for adequate skin care and wearing time). Garments must be replaced every 4-6 months for effectiveness. There are no substitutes available that offer acceptable compression treatment for this Lymphedema patient. If further information is requested, please contact our certified lymphedema therapists at [phone].       [x] Emily Burton PT, MSPT, CLT-GABY [] CHAVA Fontenot []  Nilsa Moffett PT, CLT [] Michelle Melendez PTA, CLT, CSIS    Printed MD Name Dr. Michael Turner MD Signature  Date

## 2021-07-01 ENCOUNTER — HOSPITAL ENCOUNTER (OUTPATIENT)
Dept: PHYSICAL THERAPY | Age: 61
Discharge: HOME | End: 2021-07-01
Payer: COMMERCIAL

## 2021-07-01 PROCEDURE — 97140 MANUAL THERAPY 1/> REGIONS: CPT

## 2021-07-01 NOTE — PROGRESS NOTES
LYMPHEDEMA PT DAILY TREATMENT NOTE - [facility] 2-15  30- day progress note  Patient Name: Juliano Jovel  Date:2021  : 1960  [x]  Patient  Verified  Payor: Alexandru Pham / Plan: [address] / Product Type: PPO /    In time:0750  Out time:0910  Total Treatment Time (min): 75  Total Timed Codes (min): 75  1:1 Treatment Time ( [address] only): 75  Visit #: 2  Treatment Area: Hereditary lymphedema [Q82.0]    SUBJECTIVE  Pain Level (0-10 scale): 0/10 numeric scale,no complaints of pain today  Any medication changes, allergies to medications, adverse drug reactions, diagnosis change, or new procedure performed?: [x] No    [] Yes (see summary sheet for update)  Subjective functional status/changes:   [] No changes reported  I had to have both of my ingrown toenails addressed by the Podiatrist so I haven't been able to wear my knee highs again yet. I have been wearing the Bioflect garment and I like them. They sent me the christ without the adjustable waist band again. Remember I was having all that leg pain well it turned out my potassium was off and so I am now eating better and eating foods to help with my potassium and it is better. OBJECTIVE    75 min Manual Therapy    Kinesiotaping: N/A       Manual Lymphatic Drainage (MLD):  Area to decongest: LE: bilateral   Sequence used and effectiveness: Patient is knowledgeable in self manual lynph drainage techniques and has a vasopenumatic device at home. Skin/wound care/debridement:  Patient is knowledgeable in skin care techniques. Skin is 100% intact today. Applied multi-layer compression bandaging to: Patient would like to do some bandaging over her night compression garments at night but she has had difficulty locating bandaging supplies. Therapist reviewed the benefits of bandaging over her night foam based Solaris Tribute garments and discussed the technique.   Provided patient with written list of bandaging supplies she would need, brands that she could order that are short stretch bandages and websites that are available to order from as patient would like to price shop. Therapist is recommending the following bandaging sizes:  bilateral  lower extremity: thigh high    The following multi-layer bandages were recommended:     Short stretch bandages:   8 cm  10 cm  12 cm  1 8cm, 1 10cm, and 3 12cm bandages per leg with patient bandaging to the hip. Recommended she order comprilan, rosidal K or Biaform style of short stretches bandages and provided her with online supplier options of Bandages Plus, Lymphedema Products. [employer] and Body Works compression. Patient plans to self purchase these supplies. Upper/Lower Extremity Compression: Fitted for the following compression products:    LE: bilateral bilateral lower extremity: Knee high Anushka    Style: Flat-knit and micro massaging compression garments    Brand: [employer]   bioflect micro massaging anushka    Type: Custom: Michaeline Passe and knee highs Non-Custom: Size: Bioflect anushka size 3X    Vendor: [employer] Compression    Education: Return and reordering process (by bringing prior garments into the clinic). Patient demonstrated independence with all aspect of garment donning and doffing and she is knowledgeable of garment laundering and wearing schedule. Assessed fit of the Bioflect garments and patient is presenting with significant decreases in thigh measurements currently with Bioflect garment use. Reviewed the results of her reassessment today. Assessed fit of the knee highs and both garments are slightly too short at the night despite receiving previous garment orders at this same ordered length that fit correctly. Will request a remake and add 1cm to each garment length. Patient has had 2 ingrown toenails on the first toe and she is requesting to have the remakes in the Expert line rather than the Strong line due to she is now more sensitive in the toes and the pressure is uncomfortable for her. Assessed fit of the Juzo christ's and patient likes the return to the expert material but the garment was sent without the adjustable waistband that therapist requested on order and that patient needs. Patient is also requesting additional length to the posterior aspect of the christ panty and therapist will request an additional 2 cm at this location. Patient reports the garments are comfortable otherwise and therapist feels like they fit well. Therapist placed a remake request for the knee highs and the christ from [facility] with [employer] compression. Patient will call upon garment delivery to set up another fitting if needed since she is independent with product use. Patient/family demonstrated donning and doffing with independence     Rationale: decrease edema  and and obtain appropriate compression garments  to improve the patients ability to manage and minimize her swelling over time            With   [] TE   [] TA   [x] MT   [] SC   [] other: Patient Education: [] Review HEP    [] MLD Patient Education    [] Progressed/Changed HEP based on:   [] positioning   [] Kinesiotape   [] Skin care   [] wound care   [x] other: Compression garment changes and remake process  [x] Patient/caregiver in multi-layer bandaging donning principles. , Supply ordering and types of bandaging. and Handout provided. Patient / caregiver re-demonstrated bandaging. [] Yes  [] No  Compression bandaging/garment precautions reviewed: [x] Yes  [] No     Other Objective/Functional Measures:    Height:     Weight:      BMI:     (36 or greater: adversely affecting lymphedema)    right lower 14,461.75 ml today compared to 15,072.87 ml on 6/2/2021. left lower  14,093.09 ml today compared to 14,499.96ml on 6/2/2021. Percent difference today is 2.62% as compared to 3.95% on evaluation. Pain Level (0-10 scale) post treatment: 0    ASSESSMENT/Changes in Function:   Patient received her new compression garments.   She has been wearing the new self purchased Shira Traylor since early in June and reassessment of her circumferences yields decreased circumferences in her thighs today so the new garments appear to be effective in managing her swelling. She also received her Juzo garments but some issues are present and a remake of both the knee highs and the Dufm Anant are requested today (Suni Och was sent without the ordered adjustable waistband and knee highs are too short despite patient having garments ordered at this exact same length with good fit in the past). Patient is independent with compression product use and she will call upon garment delivery to report on fit and determine if another fitting appt is needed. If no additional garment issues are reported patient will return in November for reassessment of swelling status and compression product needs. Patient will continue to benefit from skilled PT services to  address swelling, analyze compression product fit and use and instruct in home lymphedema management program to attain remaining goals. [x]  See Plan of Care  []  See progress note/recertification  []  See Discharge Summary         Progress towards goals / Updated goals:  Short term goals  Time frame: 7/30/2021  1. Patient will be fitted for new micro massaging compression garment and she will be educated in product use and wearing schedule. Goal met 7/1/2021  1. Patient will be independent with don/doff of all daytime compression garments and she will be independent in product use in order to prevent reaccumulation of fluid at discharge. Goal is met for independence with product use. Patient's garments due require a remake for proper fit and appropriate style. Goal is in progress. 2.  Pt will show stable limb volumes showing decongestion and pt. ready for transition to independent restorative phase of lymphedema therapy.   Patient's limb volumes are decreased today with use of new Bioflect micro massaging compression eric. Goal is partially met and in progress.     PLAN  []  Upgrade activities as tolerated     [x]  Continue plan of care  []  Update interventions per flow sheet       []  Discharge due to:_  []  Other:_       CHANCE Rabago, TERESITA-GABY 7/1/2021

## 2021-07-21 ENCOUNTER — APPOINTMENT (OUTPATIENT)
Dept: PHYSICAL THERAPY | Age: 61
End: 2021-07-21
Payer: COMMERCIAL

## 2021-08-05 ENCOUNTER — APPOINTMENT (OUTPATIENT)
Dept: PHYSICAL THERAPY | Age: 61
End: 2021-08-05
Payer: COMMERCIAL

## 2021-11-16 ENCOUNTER — HOSPITAL ENCOUNTER (OUTPATIENT)
Dept: PHYSICAL THERAPY | Age: 61
Discharge: HOME | End: 2021-11-16
Payer: COMMERCIAL

## 2021-11-16 VITALS — WEIGHT: 224.4 LBS | HEIGHT: 62 IN | BODY MASS INDEX: 41.3 KG/M2

## 2021-11-16 PROCEDURE — 97140 MANUAL THERAPY 1/> REGIONS: CPT

## 2021-11-16 PROCEDURE — 97161 PT EVAL LOW COMPLEX 20 MIN: CPT

## 2021-11-16 NOTE — PROGRESS NOTES
[facility]  Lymphedema Clinic  [address] [facility], [address] [place] [address], [facility] 22.    INITIAL EVALUATION    NAME: Jamie Ortega AGE: 57 y.o. GENDER: female  DATE: 11/16/2021  REFERRING PHYSICIAN:  GALINDO Woods  HISTORY AND BACKGROUND:   Primary Diagnosis:  ? Primary lymphedema (Q82.0)  Other Treatment Diagnoses:  ? Abnormality of gait (R26.9)  · Swelling not relieved by elevation (R60.9)  Date of Onset: Referred 3/2017. Patient reports swelling has been present for years since she was young, but she was first diagnosed 11/24/2015. Patient has numerous family members who present with similar swelling symptoms and this strong family history is indicative of primary lymphedema. Patient returns for re-evaluation today. Present Symptoms and Functional Limitations: Patient wears day and night compression daily. She has lost about 40 pounds since she she was last seen in the clinic. She is primarily wearing the Bioflect micromassaging short (which is actually christ length on this patient) with her custom flat-knit Juzo expert knee highs and now prefers this product over a flat-knit christ. Patient reports that she is using her Flexitouch about every other week. She is exercising but states it is not as often as she would like.     Patient reports a history of chronic swelling bilateral lower extremities with the right lower extremity being worse than the left  Lymphedema Life Impact Scale:  Scores 8 out of 68 with 12% impairment  Past Medical History:   Past Medical History:   Diagnosis Date    Arthritis     Hypertension     Ill-defined condition     bilateral LE lymphedema    Menopause      Past Surgical History:   Procedure Laterality Date    HX APPENDECTOMY      HX GYN      endometrial ablasion    HX UROLOGICAL      bladder sling    JADEN BIOPSY BREAST STEREOTACTIC Right     BENIGN     Current Medications:  Patient reports she is no longer taking oxybutynin chloride XL and esomeprazole. Current Outpatient Medications   Medication Sig    potassium chloride (K-DUR, KLOR-CON) 20 mEq tablet Take 20 mEq by mouth two (2) times a day.  metoprolol succinate (TOPROL-XL) 50 mg XL tablet Take 50 mg by mouth daily.  chlorthalidone (HYGROTEN) 50 mg tablet Take 50 mg by mouth daily.  losartan (COZAAR) 100 mg tablet Take 100 mg by mouth daily.  atorvastatin (LIPITOR) 40 mg tablet Take 40 mg by mouth daily.  oxybutynin chloride XL (DITROPAN XL) 15 mg CR tablet Take 30 mg by mouth daily.  amLODIPine (NORVASC) 10 mg tablet Take 10 mg by mouth daily.  cloNIDine HCl (CATAPRES) 0.1 mg tablet Take 0.1 mg by mouth two (2) times a day.  aspirin 81 mg chewable tablet Take 81 mg by mouth daily.  multivit-min-FA-lycopen-lutein (CENTRUM SILVER) 0.4-300-250 mg-mcg-mcg tab Take  by mouth.  omega 3-dha-epa-fish oil (FISH OIL) 100-160-1,000 mg cap Take  by mouth.  Cholecalciferol, Vitamin D3, (VITAMIN D3) 2,000 unit cap capsule Take 2,000 Units by mouth two (2) times a day.  esomeprazole (NEXIUM) 40 mg capsule Take 40 mg by mouth daily. No current facility-administered medications for this encounter. 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 Since the Covid pandemic she works 2 days in the office and 3 days at home.   She is  with 3 adult children  Living Situation: Lives with spouse           Trainable Caregiver?: Yes  Mobility: Independent gait without any assistive device for community distances  Sleeping Arrangement:  in bed  Adaptive Equipment Owned: none  Other: Patient is able to don compression stockings and does have assistance of her spouse when needed. Previous Therapy:  Patient has received Lymphedema treatment at [facility] lymphedema program in the past with her last appointment in June 2016. She was seen at [facility] Lymphedema program 4/4/2017 - 5/2017, and 11/16/17-1/29/18, 6/26/18-8/9/18, and 6/25/19 to 2/10/2020 with last appointment 7/1/2021. Compression/Lymphedema Equipment: Custom flat-knit Juzo compression knee highs bilateral lower extremities and christ pants. Bioflect christ length (actually shorts but fit like a christ length due to her height) in 3x, Bioflect trunk tank top 2x,   Solaris Tribute bilateral lower extremities thigh length. She also has used full leg bilateral lower extremity Circaid Juxtafits for night use. She also reports using body glue to assist with Juxtafit suspension on her thighs secondary to keep them form slipping down. She consistently performs skin care with Eucerin and lymphedema exercises regularly. She has a Flexitouch which she uses daily. SUBJECTIVE:   I have lost 40 pounds and I am doing pretty well. I really like this Bioflect garments and I have tried the tank as well and it works to help smooth out some of the areas where I had like pockets. The remade Bailee Jaime came in too big but it had the adjustable band. I would like to use the Bioflect garments for my christ now. I like them a lot better. My knee highs seem to fit but they go on really easy and I am not sure they are tight enough. Patients goals for therapy: decrease swelling and to be measured for new compression garments. OBJECTIVE DATA SUMMARY:   EXAMINATION/PRESENTATION/DECISION MAKING:   Pain:   Patient reports intermittent sharp pain in her thighs 1/10 numeric scale when present. Pain is currently 0/10 numeric scale.     Pain Scale 1: Numeric (0 - 10)  Pain Intensity 1: 1  Pain Location 1: Leg  Pain Orientation 1: Left; Right    Self-Care and ADLs: Independent    Skin and Tissue Assessment:  Dermal Status: Intact    Texture/Consistency: soft throughout bilateral lower extremities    Pigmentation/Color Change: Hyperpigmented    Anomalies: N/A    Circulatory: Vascular studies ruled out DVT in past    Nails: Normal    Stemmers Sign: Positive    Height:  Height: 5' 2\" (157.5 cm)  Weight:  Weight: 101.8 kg (224 lb 6.4 oz)- patient reports a 40 pound weight loss   BMI:  BMI (calculated): 41  (36 or greater: adversely affecting lymphedema)  Wound/Ulcer:  N/A        Volumetric Measurements:   Right:  13,512.38mL (decreased by 949. 37ml since 7/1/2021) Left:  13,213.11mL (decreased by 879.98ml since 7/1/2021)   % Difference: 2.26% right larger than left versus 2.62% larger 12/1/2020 Dominance: right   (See scanned graph)  Waist: 102.0cm  Hip:  123.0cm  [facility] for [facility] lower [facility]  Strength: Not formally assessed 2* patient is able to complete all functional activities in the clinic today. Sensation:  Intact     Mobility:    Bed/Chair Mobility: Independent  Transfers: Independent  Gait: Independent  Endurance: good     Safety:  Patient is alert and oriented: Yes  Safety awareness: good  Fall risk?: Low risk, she does not report a history of falls  Patient given written fall prevention handout: Yes     Based on the above components, the patient evaluation is determined to be of the following complexity level: LOW     Evaluation Time: 2187-6719 30 minutes    TREATMENT PROVIDED:   1. Treatment description:  Manual Therapy:  Reviewed skin care with low pH lotion and patient is using Eucerin daily with lotion application following manual lymph drainage techniques. Patient returns to therapy for assessment of her swelling status. Reviewed limb volume assessment results with patient today and volumes are significantly decreased bilaterally today.    She now wears her Bioflect shorts (which are christ length on her) daily in place of the custom compression flat knit stockings. Her thigh circumferences are significantly decreased today. She reports the previous Jose Enriquee Kaelynri that was remade came in too big but she did not report it to therapist since she now prefers the Bioflect products. Sized patient for [employer] christ/short and she is now sized for a 2x as she was previously wearing the 3x. Patient reports she self purchased the Bioflect tank top and she really likes it and finds it improved the shape of her upper body. She would like to try the arm sleeves as well so measured and sized patient for 2X bilateral arm sleeves. Discussed lower leg compression and patient is requesting to have the stockings be stronger so discussed increasing the pressure to a class 3 and patient would like to try this option in the Expert line from [address]. Proceeded to measure patient for custom Juzo expert bilateral lower extremity knee highs with silicone border and closed toes in patient preferred color of black. Order will be sent to patient's preferred vendor of Body Works compression. Patient reports her night compression garments fit well with no concerns currently. Reviewed with patient the 30 day remake period and discussed contacting the clinic upon delivery to fit for products if fit issues are reported. Patient prefers not to schedule a follow-up fitting until new products are delivered and if problems are noted. She was also set up for a 6 month re-evaluation today. Treatment time:  0762-7733 Minutes: 40    ASSESSMENT:   Alena Gibbons. Suzi Oropeza is a 57 y.o. female who presents with bilateral lower extremity primary lymphedema. Patient has been following her home program diligently with the exception of her no using her Flexitouch consistently. She has lost a significant amount of volume in her legs bilaterally along with weight loss since she was assessed in the clinic.     Her current daytime flat-knit compression garments are due to be replaced along with her Bioflect christ/short. Her condition is complicated by arthritis and HTN. Patient is motivated to improve her condition. Patient continues to benefit from complete decongestive therapy (CDT) including: Manual lymphatic drainage (MLD) technique and Short-stretch textile bandages/compression system to decongest limb as appropriate. This care is medically necessary due to the infection risk with lymphedema and to improve functional activities. CDT is necessary to resolve swelling to allow patient to return to wearing normal clothes/footwear and prevent worsening of symptoms, such as infections and/or hospitalizations. Patient will be independent with home program strategies to allow improved ADL ability and mobility and to allow patient to return to greatest functional independence. Rehabilitation potential is considered to be Good. Factors which may influence rehabilitation potential include patient is compliant with home program.  Patient will benefit from 2-3 physical therapy visits over 10-12 weeks to address issue with compression garment fit if present on delivery. If no garment fit issues are reported, patient will return to therapy in 6 months for a re-evaluation of swelling status and compression product needs. PLAN OF CARE:   Recommendations and Planned Interventions: Manual lymph drainage/decongestive therapy, Compression garment fitting/provision and Education in skin care and lymphedema precautions    GOALS    Patient will receive appropriately fitting compression products and she will call with feedback on garment fit upon delivery. If fit issues are present she will return to the clinic to a the issues addressed. If no fit issues are noted, patient will return to therapy in 6 months for a re-evaluation of swelling status and compression product needs. Patient has participated in goal setting and agrees to work toward plan of care.   Patient was instructed to call if questions or concerns arise. Thank you for this referral.   CHANCE Garcia, CLT-GABY   Time Calculation: 75 mins      Total timed codes: 40  1:1 Treatment time: 40    TREATMENT PLAN EFFECTIVE DATES:   11/16/2021 TO 2/11/2022  I have read the above plan of care for [address]. Becki Nicole. I certify the above prescribed services are required by this patient and are medically necessary. The above plan of care has been developed in conjunction with the lymphedema/physical therapist.       Physician Signature: ____________________________________Date:______________                                        Fátima Bond

## 2021-12-15 ENCOUNTER — TRANSCRIBE ORDER (OUTPATIENT)
Dept: SCHEDULING | Age: 61
End: 2021-12-15

## 2021-12-15 DIAGNOSIS — Z12.31 VISIT FOR SCREENING MAMMOGRAM: Primary | ICD-10-CM

## 2022-01-28 ENCOUNTER — HOSPITAL ENCOUNTER (OUTPATIENT)
Dept: MAMMOGRAPHY | Age: 62
Discharge: HOME | End: 2022-01-28
Payer: COMMERCIAL

## 2022-01-28 DIAGNOSIS — Z12.31 VISIT FOR SCREENING MAMMOGRAM: ICD-10-CM

## 2022-01-28 PROCEDURE — 77063 BREAST TOMOSYNTHESIS BI: CPT

## 2022-05-16 ENCOUNTER — HOSPITAL ENCOUNTER (OUTPATIENT)
Dept: PHYSICAL THERAPY | Age: 62
Discharge: HOME | End: 2022-05-16
Payer: COMMERCIAL

## 2022-05-16 VITALS — WEIGHT: 216.6 LBS | HEIGHT: 62 IN | BODY MASS INDEX: 39.86 KG/M2

## 2022-05-16 PROCEDURE — 97161 PT EVAL LOW COMPLEX 20 MIN: CPT

## 2022-05-16 PROCEDURE — 97140 MANUAL THERAPY 1/> REGIONS: CPT

## 2022-05-16 NOTE — PROGRESS NOTES
Outpatient Lymphedema Clinic  a part of [address] [place] [address] Dee Dee [place]acent, [address]  Phone: [phone]  Fax: [phone]    Plan of Care/Statement of Necessity for Physical Therapy/Occupational Therapy Services  2-15    Patient name: Alex Ortega  : 1960  Provider#: [id number]  Referral source: GALINDO Jackson      Medical/Treatment Diagnosis: Hereditary lymphedema [Q82.0]     Prior Hospitalization: see medical history     Comorbidities:  arthritis and HTN. Prior Level of Function:  Independent with self care and gait  Medications: Verified on Patient Summary List  Start of Care: 2022      Onset Date:Referred 3/2017. Harjinder Colónos reports swelling has been present for years since she was young, but she was first diagnosed 2015. The Plan of Care and following information is based on the information from the initial evaluation. Assessment/ doan information: Sonny Martinezrosita KALEB Segura is a 57 y.o. female who presents with bilateral lower extremity primary lymphedema.  Patient has been following her home program diligently and is using her Flexitouch consistently but is noting some problems with the device. She presents with the smallest volume measured in her legs bilaterally since she started care in this clinic.   Omari Celestin current daytime flat-knit compression garments are due to be replaced along with her Bioflect christ/short. Patient is motivated to improve her condition. Patient continues to benefit from complete decongestive therapy (CDT) including: Manual lymphatic drainage (MLD) technique and Short-stretch textile bandages/compression system to decongest limb as appropriate.         This care is medically necessary due to the infection risk with lymphedema and to improve functional activities.   CDT is necessary to resolve swelling to allow patient to return to wearing normal clothes/footwear and prevent worsening of symptoms, such as infections and/or hospitalizations. Patient will be independent with home program strategies to allow improved ADL ability and mobility and to allow patient to return to greatest functional independence. Evaluation Complexity History MEDIUM  Complexity : 1-2 comorbidities / personal factors will impact the outcome/ POC ; Examination LOW Complexity : 1-2 Standardized tests and measures addressing body structure, function, activity limitation and / or participation in recreation  ;Presentation LOW Complexity : Stable, uncomplicated  ;Clinical Decision Making Other outcome measures  LLIS 13% impairment  LOW   Overall Complexity Rating: LOW     Problem List: pain affecting function and edema affecting function   Treatment Plan may include any combination of the following: Therapeutic exercise, Manual therapy, Patient education, Self Care training and Other: mesauring and fitting for compression garments, vasopneumatic device  Patient / Family readiness to learn indicated by: asking questions, trying to perform skills and interest  Persons(s) to be included in education: patient (P)  Barriers to Learning/Limitations: None  Patient Goal (s): to be measured for new compression garments and to see about the pump problems  Patient Self Reported Health Status:  Not assessed  Rehabilitation Potential: excellent    Short Term Goals: To be accomplished in 8 weeks:  1. Patient will receive correctly fitting compression garments for bilaterally lower extremities for optimal long term lymphedema management. 2.  Patient will be independent with don/doff of compression system and use in order to prevent reaccumulation of fluid at discharge. 3.  Therapist will contact [facility] to pursue upgraded vasopneumatic device for optimal swelling management. Frequency / Duration: Patient to be seen 1 time very 4-6 for 10-12 weeks dependent on compression garment delivery.     Patient/ Caregiver education and instruction: other compression garment options and ordering, reviewed lymphedema management program, vasopneumatic device options    []  Plan of care has been reviewed with PTA      Certification Period: 5/16/2022- 12/31/2022     CHANCE Viveros CLT-LANA 5/16/2022     ________________________________________________________________________    I certify that the above Therapy Services are being furnished while the patient is under my care. I agree with the treatment plan and certify that this therapy is necessary.     [de-identified] Signature:____________________  Date:____________Time: _________         GALINDO Merritt

## 2022-05-16 NOTE — PROGRESS NOTES
Statement of Medical Necessity  Page 1 of 2      Lotus Barone Sai 1960 Today's Date: 5/16/2022 MERVAT: [employer]   Payor: Amy Howie / Plan: [address] / Product Type: PPO /  ME: TBD  Refills: 2                   Diagnosis  []   I97.2 Post-Mastectomy Lymphedema []   I87.2 Venous Insufficiency   []   I89.0 Lymphedema, other secondary  []   I83.019 Venous Stasis Ulcer LE, Right   []   I89.9 Unspecified Lymphatic Disorder []   I83.029 Venous Stasis Ulcer LE, Left   [x]   R60.9 Swelling not relieved by elevation [x]   Q82.0 Hereditary/ Congenital Lymphedema   []   C50.211 Malignant neoplasm of breast, Right []   G89.3  Cancer associated pain   []   C50.212 Malignant neoplasm of breast, Left []   L03.115 LE Cellulitis, Right   []    []   L03.116 LE Cellulitis, Left                                   Lotus Barone Sai    1960  Page 2 of 2    Physician Order for DME for Diagnosis of Lymphedema as Listed on Statement of Medical Necessity, Page 1         Recommended Product:  Units Upper Extremity Rt Lt Units Lower Extremity Rt Lt    Circ-Aid, Ready Wrap, Sigvaris Arm    Inelastic binders (Dede Opal)  []Foot   []Below Knee   []Knee   []Thigh      Circ-Aid Ready Wrap, Sigvaris hand    Seth Reginald, night use []Full Leg  []Lower Leg      Tribute Arm, night use   2 Tribute, night use  [x]Full Leg  []Lower Leg x x    Seth Reginald Arm, night use    Vance Sleeve Leg/ Foot, night use      Gradiant Compression Sleeves & Gloves  []Custom [] RM Arm:  []CCL1 []CCL2 []CCL3  []Custom [] RM Glove: []CCL1 []CCL2 []CCL3     4 Gradient Compression Stockings   []Custom  []RM Lower Extremity:   []CCL1       []CCL2         [x]CCL3   [x]Knee       []Thigh        []Waist Length x x    Vance sleeve arm w/ hand, night use    Tribute Wrap, night use      Compression Bra   2 RM lower extremity gradient compression garments, waist length x x    Compression Vest         The above patient was referred for treatment of Lymphedema due to the diagnosis indicated above. Lymphedema is a progressive and chronic condition. It may cause acute infections, muscle atrophy, discomfort, and connective tissue fibrosis with irreversible tissue damage, decreased ROM in the affected limb and diminished functional mobility possibly interfering with independence and ability to work. Recurrent infections and wound complications that commonly occur with Lymphedema often require hospitalization and extensive wound care, thus increasing medical costs. The patient has received complete decongestive therapy which includes manual lymphatic drainage, lymphedema specific exercises, compression bandaging, and hygiene/skin care. Goals of therapy are to reduce the edema and prevent re-accumulation of fluid with its complications. It is medically necessary for this patient to have daytime garments to control this condition. They will need 2 sets (one set to wear and one set to wash for adequate skin care and wearing time). Garments must be replaced every 4-6 months for effectiveness. There are no substitutes available that offer acceptable compression treatment for this Lymphedema patient. If further information is requested, please contact our certified lymphedema therapists at [phone].     [x] Pily Pardo PT, MSPT, CLT-GABY [] Michael Ornelas, MS, OTR/L, CLT []  Guillermina Solares, PT, CLT [] Orlando Cohen, PTA, CLT, CSIS    Printed  Provider Name GALINDO Coley             Provider Signature  Date    Provider NPI

## 2022-05-16 NOTE — PROGRESS NOTES
PT/OT LYMPHEDEMA INITIAL EVALUATION NOTE - [facility] 2-15    Patient Name: Miguel Amador Ortega  Date:2022  : 1960  [x]  Patient  Verified  Payor: Gretchen Anne / Plan: Abisai Sena / Product Type: PPO /    In time: 1330  Out time: 1500  Total Treatment Time (min): 90  Total Timed Codes (min): 55  1:1 Treatment Time ( only): 55  Visit #: 1    Treatment Area: Hereditary lymphedema [Q82.0]    SUBJECTIVE  Pain Level (0-10 scale):  0 out of 10 numeric scale, patient reports occasional pain 1-2 out of 10 at worst and it is present at her knees which she describes as being \"sore. \"  Any medication changes, allergies to medications, adverse drug reactions, diagnosis change, or new procedure performed?: [] No    [x] Yes (see summary sheet for update)  Subjective:    My pump just isn't working right. It skips over parts of my legs. I did everything the company told me to do last time but it didn't help. I am most concerned about my lower inner thighs that just do not seem to go down. PLOF: Independent with gait and self care  Mechanism of Injury:  Patient wears day and night compression daily. She is primarily wearing the Bioflect micromassaging short (which is actually christ length on this patient) with her custom flat-knit Juzo expert knee highs and now prefers this product over a flat-knit christ. Patient reports that she is using her Flexitouch consistently but feels like the machine is not working properly and is skipping over areas of her legs.   She is exercising consistently.    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 Bioflect christ length (actually shorts but fit like a christ length due to her height) in 3x, Bioflect trunk tank top 2x,   Solaris Tribute bilateral lower extremities thigh length.  She also has used full leg bilateral lower extremity Circaid Juxtafits for night use. She also reports using body glue to assist with Juxtafit suspension on her thighs secondary to keep them form slipping down.  She consistently performs skin care with Eucerin and lymphedema exercises regularly. Khang Loza has a Flexitouch which she uses daily, but it is not functioning properly.     Skin and Tissue Assessment:  Dermal Status: Intact    Texture/Consistency: Boggy  medial distal thighs, soft in lower legs    Pigmentation/Color Change: Hyperpigmented    Anomalies: N/A    Circulatory: Vascular studies ruled out DVT in past    Nails: Normal    Stemmers Sign: Positive    Volumetric Measurements:   Right:  13,252.21mL (decreased by 260.17ml since 11/16/2021) Left:  12,676.40mL (decreased by 536.71ml since 11/16/2021)   % Difference: 4.54% versus 2.26% right larger than left on last assessment 11/16/2021 Dominance: right        0 min Therapeutic Exercise:  [x] patient is exercising regularly and consistently at home.  She performs exercises in both the am and pm including a walking program.    Rationale: to activate muscle pump to improve lymphatic fluid movement        55 min Manual Therapy: Manual Therapy:  Reviewed skin care with low pH lotion and patient is using Eucerin daily with lotion application following manual lymph drainage techniques. Patient returns to therapy for assessment of her swelling status. Reviewed limb volume assessment results with patient today and volumes are decreased bilaterally today and are the smallest measured in the clinic since start of care in 2017. She wears her Bioflect shorts (which are christ length on her) daily in place of the custom compression flat knit stockings. Her thigh circumferences are decreased today with the exception of measurements and medial distal thigh and mid thigh where patient reports her pump seems to be skipping when she is using it. She reports she has made all changes as directed by "[employer] [facility] when this issue was last reported but she continues to feel like the machine is not working and is skipping over portions of her leg. Discussed with patient as her device is clearly not working properly and changes provided by [employer] have not fixed the problem. Patient has an older device so discussed with her the possibility of trying to upgrade her device to the Flexitouch Plus. Patient would like to see if this is possible. Therapist will contact [employer]  to discuss this possibility and options. Patient is very compliant with product use and relies on this tool to help manage her swelling. Patient is managing well in the Bioflect garments and she was resized for Bioflect micromassaging christ/short and she  remains for a 2x. Patient reports she also continues to use the Bioflect tank top and she really likes it.   Discussed lower leg compression and patient wears her compression class 3 daily and she reports good garment tolerance and control but she is having increased toe tip pain and sensitive and is asking to try an open toe garment instead of closed toe. Cautioned patient about the sliding back on the foot of the open garment and that it may require some readjustment during the day. Patient would like to try this option in the Expert line from Margie Zepeda 25. Proceeded to measure patient for custom Juzo expert bilateral lower extremity knee highs with silicone border and open toes in patient preferred color of black. Discussed her toes and she does present with some toes swelling so discussed the new non custom Juzo toe cap and demonstrated the material for her. Patient would like to pursue these toe caps which leave the tip of the toe out but will still provide compression to her toes. Sized patient for toe caps and she is a size medium bilaterally. Order will be sent to patient's preferred vendor of [employer] compression. Patient did not report any issues with her night compression garments. Reviewed with patient the 30 day remake/return  period on her orders and discussed returning for a fitting which patient is agreeable to do.       Rationale: obtain appropriate compression garments and review home management routines to improve the patients ability to manage swelling over time             With   [] TE   [] TA   [x]  MT   [] SC   [x] other: Patient Education: [x] Review HEP    [] Progressed/Changed HEP based on:   [] positioning   [] body mechanics   [] transfers   [] heat/ice application    [x] MT/other: Reviewed skin care, infection minimization, compression garment options and ordering process, vasopneumatic pump use and function and possibility of upgrading device,      Other Objective/Functional Measures:   Lymphedema Life Impact Scale: score 9 out of 68 with 13% impairment    Function: Independent with gait and self care         Pain Level (0-10 scale) post treatment: 0 out of 10 numeric scale    ASSESSMENT/Changes in Function:   Evaluation completed. Measured for appropriate compression garments. Reviewed lymphedema management program.  Discussed vasopneumatic device function and options.     [x]  See Plan of [address], MSPT, [facility] 5/16/2022

## 2022-06-20 NOTE — PROGRESS NOTES
[facility]  a part of [address] [place] [address] [place] [address] Dee [place], [address] [facility], [facility], [address]  Phone: [phone]  Fax: [phone]        Progress Note    Name: Mardel Pattee. Eben Paget   : 1960   MD: Anastasiya Colon, FNP       Treatment Diagnosis: Hereditary lymphedema [Q82.0]  Start of Care: 2022    Visits from Start of Care: 1 after evaluation  Missed Visits: 0    Summary of Care: Measured and fitted for compression garments, educated in compression garment use and wearing schedule, reviewed self lymphedema management techniques    Assessment / Recommendations:   Patient returns to the clinic with compression garments in place except Ilean Cos which is scheduled to be delivered this week. New Juzo toe caps fit well and patient's toe swelling has improved with improved toe shape and softening in texture. The new Juxo expert custom knee highs require a remake as the foot portion is too long and is covering the fifth toe and a portion of the first toe. This may be applying too much pressure in the area and therapist has requested a remake to correct the problem. Patient is independent with all aspects of product use. She would like to order a second set of garments, but would like to investigate another vendor before ordering again with [employer]. Therapist will request a benefits check with [employer] and patient is to call with feedback on fit of remake garment upon delivery. Will determine at that time if another garment fit is needed and which vendor to order through.     Patient will continue to benefit from skilled PT services to  address swelling, analyze compression product fit and use, instruct in home lymphedema management program and measure for compression products to attain remaining goals. Short Term Goals: To be accomplished in 8 weeks:  1.   Patient will receive correctly fitting compression garments for bilaterally lower extremities for optimal long term lymphedema management. Toe caps fit well, but knee highs require a remake. Status at last note/certification: Initiated  Status at progress note: not met/ in progress      2. Patient will be independent with don/doff of compression system and use in order to prevent reaccumulation of fluid at discharge. Status at last note/certification:Initiated  Status at progress note: met      3. Therapist will contact [employer] to pursue upgraded vasopneumatic device for optimal swelling management.   Status at last note/certification: Initiated  Status at progress note: met        Other: continue with treatment following plan of care established on evaluation       CHANCE Ellis, TERESITA-GABY 6/20/2022

## 2022-06-20 NOTE — PROGRESS NOTES
LYMPHEDEMA PT DAILY TREATMENT NOTE - [facility] 2-15    Patient Name: Piotr Martínez Ortega  Date:2022  : 1960  [x]  Patient  Verified  Payor: Dima Sink / Plan: Paco Ryan / Product Type: PPO /    In time:0800 Out time:0915  Total Treatment Time (min): 75  Total Timed Codes (min): 75  1:1 Treatment Time ( only): 75   Visit #:2  Treatment Area: Hereditary lymphedema [Q82.0]    SUBJECTIVE  Pain Level (0-10 scale): 0 out of 10 numeric scale  Any medication changes, allergies to medications, adverse drug reactions, diagnosis change, or new procedure performed?: [x] No    [] Yes (see summary sheet for update)  Subjective functional status/changes:   [] No changes reported  I got the new Flexitouch and the  is scheduled to come out tonight to fit it. These new stockings are definitely harder to get on than the last ones. My big toes are a little bit sore and I am not sure if it is the toe cap causing it. OBJECTIVE        75 min Manual Therapy    Kinesiotaping:  N/A       Manual Lymphatic Drainage (MLD):  Area to decongest: LE: bilateral   Sequence used and effectiveness: Secondary sequence for lower extremities with trunk involvement. Deferred for garment fitting and education as well as remeasuring. Skin/wound care/debridement: Reviewed skin care principles:   patient is independent with skin care and is using eucerin at home. Applied multi-layer compression bandaging to: Not required currently. Upper/Lower Extremity Compression: Fitted for the following compression products:    LE: bilateral bilateral lower extremity: Knee high Anushka Other: toe cap    Style: Circular knit and Flat-knit    Brand: [employer]    Type: Custom: E-D[employer] Expert knee highs with open toe and silicone border class 3, Juzo toe caps, seamless size medium    Vendor: [employer]    Education: Educated patient in compression garment donning and doffing. Glove use with donning. Daily wear schedule.   Daily laundering. Garment lifespan. Knee highs are correct lower leg length but foot lengths are too long bilaterally so stocking in is covering about 1/2 of  the first and fifth toe. This is most likely causing the discomfort patient is reporting. Removed toe caps to see stocking foot length better and on assessment of lengths they are about 0.5cm longer than requested bilaterally but the foot lengths need to be shortened by a greater amount than that 1.0 cm to 1.5 cm. Reassessed lengths and opted to change the open toed foot length to 19.5 bilaterally. Otherwise stockings fit very well. Will request a remake with [employer] compression to address these length changes to promote safe and optimal wear with the toe caps. Toes caps fit patient well and softening is noted in toes with less toe squaring noted. Patient is independent with garment donning and arrived with products correctly in place today. She was sent shorts rather than capris and this is being addressed by vendor and was most likely therapist error when order was sent to vendor. Patient states they are supposed to be delivered this week. Discussed ordering status and patient is instructed to contact therapist when remakes are delivered and report on fitting to determine if any additional fitting visits are needed. If remade garments fit well, patient expresses that she would like to order a second set but states that she had to self pay for this first set. She is requesting to try her previous vendor of [address] [facility] [employer] to see if she would have any coverage for the products with them. Therapist will request a benefit check with them. If no coverage is available, patient prefers to reorder her products than with [employer] compression.        Rationale: to obtain appropriate compression garments to improve the patients ability to manage her swelling and prevent worsening over time    0   Vasopneumatic Device:    Patient received her new Flexitouch and is scheduled for training with a  this evening. Rationale: To improve lymphatic fluid movement and decrease swelling to improve the patients ability to perform ADL and IADL skills and prevent worsening of swelling over time. With   [] TE   [] TA   [x] MT   [] SC   [x] other: Patient Education: [] Review HEP    [] MLD Patient Education    [] Progressed/Changed HEP based on:   [] positioning   [] Kinesiotape   [x] Skin care   [] wound care   [x] other: compression garment donning and doffing, remake options and process, reordering process, garment use and wearing schedule  []    Patient / caregiver re-demonstrated bandaging. [] Yes  [] No  Compression bandaging/garment precautions reviewed: [] Yes  [] No     Other Objective/Functional Measures:     Height:  Height: 5' 2\" (157.5 cm)  Weight:  Weight: 98.2 kg (216 lb 9.6 oz)   BMI:  BMI (calculated): 39.6  (36 or greater: adversely affecting lymphedema)     right lower 12,923.82 ml today compared to 13,252.21mL on initial evaluation. left lower  12,476.67 ml today compared to 12,676.40mL on initial evaluation. Percent difference today is 3.58% right larger than left as compared to right LE 4.54% larger than left on evaluation. Pain Level (0-10 scale) post treatment: out of 10 numeric scale       ASSESSMENT/Changes in Function:   Patient returns to the clinic with compression garments in place except Bioflect christ which is scheduled to be delivered this week. New Juzo toe caps fit well and patient's toe swelling has improved with improved toe shape and softening in texture. The new Juxo expert custom knee highs require a remake as the foot portion is too long and is covering the fifth toe and a portion of the first toe. This may be applying too much pressure in the area and therapist has requested a remake to correct the problem. Patient is independent with all aspects of product use.   She would like to order a second set of garments, but would like to investigate another vendor before ordering again with [employer]. Therapist will request a benefits check with [employer] and patient is to call with feedback on fit of remake garment upon delivery. Will determine at that time if another garment fit is needed and which vendor to order through. Patient will continue to benefit from skilled PT services to  address swelling, analyze compression product fit and use, instruct in home lymphedema management program and measure for compression products to attain remaining goals. []  See Plan of Care  [x]  See progress note/recertification  []  See Discharge Summary         Progress towards goals / Updated goals:  Short Term Goals: To be accomplished in 8 weeks:  1. Patient will receive correctly fitting compression garments for bilaterally lower extremities for optimal long term lymphedema management. Toe caps fit well, but knee highs require a remake. 2.  Patient will be independent with don/doff of compression system and use in order to prevent reaccumulation of fluid at discharge. Patient is independent. Goal met 6/21/2022  3. Therapist will contact [facility] to pursue upgraded vasopneumatic device for optimal swelling management.  Goal met 6/21/2022.        PLAN  []  Upgrade activities as tolerated     [x]  Continue plan of care  []  Update interventions per flow sheet       []  Discharge due to:_  [x]  Other:_fit for knee remade knee highs if needed, otherwise reorder with appropriate vendor once fit is confirmed       CHANCE Larry, MOMO 6/20/2022

## 2022-06-21 ENCOUNTER — HOSPITAL ENCOUNTER (OUTPATIENT)
Dept: PHYSICAL THERAPY | Age: 62
Discharge: HOME | End: 2022-06-21
Payer: COMMERCIAL

## 2022-06-21 VITALS — BODY MASS INDEX: 39.86 KG/M2 | WEIGHT: 216.6 LBS | HEIGHT: 62 IN

## 2022-06-21 PROCEDURE — 97140 MANUAL THERAPY 1/> REGIONS: CPT

## 2022-11-07 ENCOUNTER — HOSPITAL ENCOUNTER (OUTPATIENT)
Dept: PHYSICAL THERAPY | Age: 62
Discharge: HOME | End: 2022-11-07
Payer: COMMERCIAL

## 2022-11-07 VITALS — WEIGHT: 213 LBS | HEIGHT: 62 IN | BODY MASS INDEX: 39.2 KG/M2

## 2022-11-07 PROCEDURE — 97161 PT EVAL LOW COMPLEX 20 MIN: CPT

## 2022-11-07 PROCEDURE — 97140 MANUAL THERAPY 1/> REGIONS: CPT

## 2022-11-07 NOTE — PROGRESS NOTES
[facility]  [address], [address], [address]    Continued Plan of Care/ Re-certification for Physical or Occupational Therapy Services    Garret Ortega1960   Amada Oleary NP   Provider # [id number]                    Diagnosis: Hereditary lymphedema [Q82.0]  Onset Date: :Referred 3/2017. Patient reports swelling has been present for years since she was young, but she was first diagnosed   Prior Hospitalization: None noted     Visits from Start of Care: 3     Missed Visits: 0  Start of Care:  5/16/2022               Prior Level of Function:  Referred 3/2017. Patient reports swelling has been present for years since she was young, but she was first diagnosed 11/24/2015. The Plan of Care and following information is based on the patient's current status:  Short/Long Term Goals:    1. Patient will receive correctly fitting compression garments for bilaterally lower extremities for optimal long term lymphedema management. Toe caps fit well, but knee highs require modifications on new order  Status at last note/certification: Initiated  Status at progress note: not met/ in progress        2. Patient will be independent with don/doff of compression system and use in order to prevent reaccumulation of fluid at discharge. Status at last note/certification:Initiated  Status at progress note: met        3. Therapist will contact [facility] to pursue upgraded vasopneumatic device for optimal swelling management.   Status at last note/certification: Initiated  Status at progress note: met      Key functional changes: patient received remade compression garments but foot is still too long on remade garments, limb volumes have decreased bilaterally      Problems/ barriers to goal attainment: none noted     Problem List: other patient requires regular remeasuring to replace compression garments and prevent worsening of swelling over time    Treatment Plan: Manual therapy, Self care/home management, and Other: measuring and fitting for compression garments      Patient Goal (s) has been updated and includes:    Goal 1 will be extended to be met in 2 treatments    Goals for this certification period to be accomplished in 2 treatments:   1. Patient will receive correctly fitting compression garments for bilaterally lower extremities for optimal long term lymphedema management. Toe caps fit well, but knee highs require modifications on new order  Status at last note/certification: Initiated  Status at progress note: not met/ in progress    Frequency / Duration: Patient to be seen 1 visit very 5-8 weeks for 10-12 weeks:    Assessment / Recommendations   Patient returns to the clinic to be measured for new compression garments for bilateral lower extremities. She reports that the previously remade knee highs are still too long and she has been wearing them folded over. Therapist measured for new garments today and will request a slant toe to improve foot fit. This is not a standard option on her style of compression garment but will request it to remove pressure off the 5th toe. Patient will also benefit from new toe caps and christ. All orders will be sent to new vendor [address] [place] [address] per patient request.  She will call with feedback on fit upon delivery to determine if additional follow-up fitting is needed. If no fit issues are noted, she will be discharged and return for a re-evaluation in 6 months. Patient will continue to benefit from skilled PT services to  address swelling, analyze compression product fit and use, instruct in home lymphedema management program and measure for compression products to attain remaining goals.   Certification Period: 11/7/2022-1/30/2023     CHANCE Zamarripa, MOMO 11/7/2022    ________________________________________________________________________  I certify that the above Therapy Services are being furnished while the patient is under my care. I agree with the treatment plan and certify that this therapy is necessary. Y or N I have read the above and request that my patient continue as recommended.   Y or N I have read the above report and request that my patient continue therapy with the following changes/special instructions  Y or N I have read the above report and request that my patient be discharged from therapy    Physician's Signature:_________________ Date:___________Time:__________           Berry Liu NP

## 2022-11-07 NOTE — PROGRESS NOTES
LYMPHEDEMA PT DAILY TREATMENT NOTE - [facility] 2-15    Patient Name: Junior Malik Ortega  Date:2022  : 1960  [x]  Patient  Verified  Payor: Queen Antonio / Plan: [facility] / Product Type: PPO /    In time:  Out time: 925  Total Treatment Time (min): 90  Total Timed Codes (min):  90  1:1 Treatment Time ( only): 90   Visit #:3  Treatment Area: Hereditary lymphedema [Q82.0]    SUBJECTIVE  Pain Level (0-10 scale): 0 out of 10 numeric scale  Any medication changes, allergies to medications, adverse drug reactions, diagnosis change, or new procedure performed?: [x] No    [] Yes (see summary sheet for update)  Subjective functional status/changes:   [] No changes reported  I got the new Flexitouch and  I am using it about 4 days a week. The remade stockings that I got still are too long in the foot and I am not sure that they even changed them at all. OBJECTIVE        90 min Manual Therapy- reviewed home program.  Patient is exercising regularly. Kinesiotaping:  N/A       Manual Lymphatic Drainage (MLD):  Area to decongest: LE: bilateral   Sequence used and effectiveness: Secondary sequence for lower extremities with trunk involvement. Patient is skilled in self manual lymph drainage and has no questions about techniques today. Deferred for garment fitting and education as well as remeasuring. Skin/wound care/debridement: Reviewed skin care principles:   patient is independent with skin care and is using eucerin at home. Reviewed signs and symptoms of cellulitis. Applied multi-layer compression bandaging to: Not required currently.    Upper/Lower Extremity Compression: Measured for the following compression products:    LE: bilateral bilateral lower extremity: Knee high Anushka Other: toe cap    Style: Circular knit and Flat-knit    Brand: [email or web address]    Type: Custom: Juzo Expert knee highs with open toe and silicone border class 3 , Juzo toe caps 20-30mmHg, seamless size medium,  Bioflect anushka size 2X    Vendor: [employer]    Education: Educated patient in compression garment donning and doffing. Glove use with donning. Daily wear schedule. Daily laundering. Garment lifespan. Knee highs are correct lower leg length but foot lengths are too long bilaterally so opted to try the slant toe on her new garments if Bo Rooney will do it for her order. The garments are still 1.5cm to 2.0cm too long bilaterally and they are creating pressure on the 5th toe. Resized patient for toe caps and Bioflect christ. Patient requests to have this order sent to a new vendor for her [address].  She advised to call is she has any issues with the new vendor or with pricing. Discussed ordering status and patient is instructed to contact therapist upon delivery to determine if additional fitting or remake is needed. Patient was reminded of 30 day remake period. She verbalized understanding. Discussed toe cap options as they are now available in 20-30mmHg versus patient's current 15-20Hg. Discussed the differences at length with patient and patient and therapist are opting to order her the higher compression today to increase compression at the base of the toes. Rationale:  to obtain appropriate compression garments  to improve the patients ability to manage her swelling and prevent worsening over time    0   Vasopneumatic Device:    Patient received her new Flexitouch and she is using it about 4 days a week. It is in working order. Rationale: To improve lymphatic fluid movement and decrease swelling to improve the patients ability to perform ADL and IADL skills and prevent worsening of swelling over time.            With   [] TE   [] TA   [x] MT   [] SC   [x] other: Patient Education: [x] Review home program    [] MLD Patient Education    [] Progressed/Changed HEP based on:   [] positioning   [] Kinesiotape   [x] Skin care   [] wound care   [x] other: compression garment options, sizing and ordering, 30 day remake process  [x]  Vasopneuamtic device use  Patient / caregiver re-demonstrated bandaging. [] Yes  [] No  Compression bandaging/garment precautions reviewed: [] Yes  [] No     Other Objective/Functional Measures:     Height:  Height: 5' 2\" (157.5 cm)  Weight:  Weight: 96.6 kg (213 lb)   BMI:  BMI (calculated): 38.9  (36 or greater: adversely affecting lymphedema)     right lower 12,492. 87ml today versus 12,923.82 ml 6/21/2022 compared to 13,252.21mL on initial evaluation. left lower  12,272.08ml today versus 12,476.67 ml  6/21/2022 compared to 12,676.40mL on initial evaluation. Percent difference today is 1.80% right larger than left as compared to right LE 4.54% larger than left on evaluation. Pain Level (0-10 scale) post treatment: 0 out of 10 numeric scale       ASSESSMENT/Changes in Function:    Patient returns to the clinic to be measured for new compression garments for bilateral lower extremities. She reports that the previously remade knee highs are still too long and she has been wearing them folded over. Therapist measured for new garments today and will request a slant toe to improve foot fit. This is not a standard option on her style of compression garment but will request it to remove pressure off the 5th toe. Patient will also benefit from new toe caps and christ. All orders will be sent to new vendor [address] [place] [id number] per patient request.  She will call with feedback on fit upon delivery to determine if additional follow-up fitting is needed. If no fit issues are noted, she will be discharged and return for a re-evaluation in 6 months. Limb volumes are decreased bilaterally today and are the smallest measured in this clinic to date.      Patient will continue to benefit from skilled PT services to  address swelling, analyze compression product fit and use, instruct in home lymphedema management program and measure for compression products to attain remaining goals. Patient will continue to benefit from skilled PT services to  address swelling, analyze compression product fit and use, instruct in home lymphedema management program and measure for compression products to attain remaining goals. []  See Plan of Care  [x]  See progress note/recertification  []  See Discharge Summary         Progress towards goals / Updated goals:  Short Term Goals: To be accomplished in 2 treatments:  1. Patient will receive correctly fitting compression garments for bilaterally lower extremities for optimal long term lymphedema management. Knee highs are still too long  2. Patient will be independent with don/doff of compression system and use in order to prevent reaccumulation of fluid at discharge. Patient is independent. Goal met 6/21/2022  3. Therapist will contact [facility] to pursue upgraded vasopneumatic device for optimal swelling management. Goal met 6/21/2022.         PLAN  []  Upgrade activities as tolerated     [x]  Continue plan of care/updated plan of care  []  Update interventions per flow sheet       []  Discharge due to:_  [x]  Other:_fit for for new compression garments upon delivery if needed      CHANCE Wei, MOMO 11/7/2022

## 2023-04-10 ENCOUNTER — APPOINTMENT (OUTPATIENT)
Dept: PHYSICAL THERAPY | Age: 63
End: 2023-04-10

## 2023-05-01 ENCOUNTER — APPOINTMENT (OUTPATIENT)
Facility: HOSPITAL | Age: 63
End: 2023-05-01
Payer: COMMERCIAL

## 2023-05-02 ENCOUNTER — HOSPITAL ENCOUNTER (OUTPATIENT)
Dept: PHYSICAL THERAPY | Age: 63
Discharge: HOME | End: 2023-05-02
Payer: COMMERCIAL

## 2023-05-02 VITALS — WEIGHT: 216.6 LBS | BODY MASS INDEX: 39.86 KG/M2 | HEIGHT: 62 IN

## 2023-05-02 PROCEDURE — 97161 PT EVAL LOW COMPLEX 20 MIN: CPT

## 2023-05-02 PROCEDURE — 9990 CHARGE CONVERSION

## 2023-05-02 PROCEDURE — 97760 ORTHOTIC MGMT&TRAING 1ST ENC: CPT

## 2023-05-02 PROCEDURE — 97535 SELF CARE MNGMENT TRAINING: CPT

## 2023-08-22 ENCOUNTER — OFFICE VISIT (OUTPATIENT)
Age: 63
End: 2023-08-22
Payer: COMMERCIAL

## 2023-08-22 VITALS
WEIGHT: 214 LBS | HEART RATE: 66 BPM | HEIGHT: 62 IN | BODY MASS INDEX: 39.38 KG/M2 | OXYGEN SATURATION: 96 % | SYSTOLIC BLOOD PRESSURE: 126 MMHG | DIASTOLIC BLOOD PRESSURE: 71 MMHG

## 2023-08-22 DIAGNOSIS — I10 PRIMARY HYPERTENSION: ICD-10-CM

## 2023-08-22 DIAGNOSIS — E66.9 OBESITY, CLASS II, BMI 35-39.9: ICD-10-CM

## 2023-08-22 DIAGNOSIS — G47.33 OBSTRUCTIVE SLEEP APNEA (ADULT) (PEDIATRIC): Primary | ICD-10-CM

## 2023-08-22 DIAGNOSIS — Z72.821 INADEQUATE SLEEP HYGIENE: ICD-10-CM

## 2023-08-22 PROCEDURE — 3074F SYST BP LT 130 MM HG: CPT | Performed by: INTERNAL MEDICINE

## 2023-08-22 PROCEDURE — 3078F DIAST BP <80 MM HG: CPT | Performed by: INTERNAL MEDICINE

## 2023-08-22 PROCEDURE — 99204 OFFICE O/P NEW MOD 45 MIN: CPT | Performed by: INTERNAL MEDICINE

## 2023-08-22 ASSESSMENT — SLEEP AND FATIGUE QUESTIONNAIRES
NECK CIRCUMFERENCE (INCHES): 15
ESS TOTAL SCORE: 13
HOW LIKELY ARE YOU TO NOD OFF OR FALL ASLEEP WHILE SITTING INACTIVE IN A PUBLIC PLACE: 1
HOW LIKELY ARE YOU TO NOD OFF OR FALL ASLEEP WHEN YOU ARE A PASSENGER IN A CAR FOR AN HOUR WITHOUT A BREAK: 3
HOW LIKELY ARE YOU TO NOD OFF OR FALL ASLEEP IN A CAR, WHILE STOPPED FOR A FEW MINUTES IN TRAFFIC: 1
HOW LIKELY ARE YOU TO NOD OFF OR FALL ASLEEP WHILE SITTING AND TALKING TO SOMEONE: 1
HOW LIKELY ARE YOU TO NOD OFF OR FALL ASLEEP WHILE WATCHING TV: 1
HOW LIKELY ARE YOU TO NOD OFF OR FALL ASLEEP WHILE LYING DOWN TO REST IN THE AFTERNOON WHEN CIRCUMSTANCES PERMIT: 2
HOW LIKELY ARE YOU TO NOD OFF OR FALL ASLEEP WHILE SITTING QUIETLY AFTER LUNCH WITHOUT ALCOHOL: 2
HOW LIKELY ARE YOU TO NOD OFF OR FALL ASLEEP WHILE SITTING AND READING: 2

## 2023-08-22 NOTE — PATIENT INSTRUCTIONS
[address] [facility] [place], [address]  Tel.  [phone]  Fax. [address] [facility], [address] [place] [address]  Tel.  [phone]  Fax. [phone] [facility], [address] [facility]  Tel.  [phone]  Fax. [phone]     Sleep Apnea: After Your Visit  Your Care Instructions  Sleep apnea occurs when you frequently stop breathing for 10 seconds or longer during sleep. It can be mild to severe, based on the number of times per hour that you stop breathing or have slowed breathing. Blocked or narrowed airways in your nose, mouth, or throat can cause sleep apnea. Your airway can become blocked when your throat muscles and tongue relax during sleep. Sleep apnea is common, occurring in 1 out of 20 individuals. Individuals having any of the following characteristics should be evaluated and treated right away due to high risk and detrimental consequences from untreated sleep apnea:  Obesity  Congestive Heart failure  Atrial Fibrillation  Uncontrolled Hypertension  Type II Diabetes  Night-time Arrhythmias  Stroke  Pulmonary Hypertension  High-risk Driving Populations (pilots, truck drivers, etc.)  Patients Considering Weight-loss Surgery    How do you know you have sleep apnea? You probably have sleep apnea if you answer 'yes' to 3 or more of the following questions:  S - Have you been told that you Snore? T - Are you often Tired during the day? O - Has anyone Observed you stop breathing while sleeping? P- Do you have (or are being treated for) high blood Pressure? B - Are you obese (Body Mass Index > 35)? A - Is your Age 48years old or older? N - Is your Neck size greater than 16 inches? G - Are you male Gender? A sleep physician can prescribe a breathing device that prevents tissues in the throat from blocking your airway. Or your doctor may recommend using a dental device (oral breathing device) to help keep your airway open.  In some cases, surgery may

## 2023-08-22 NOTE — PROGRESS NOTES
[address] [facility], [address]  Tel.  [phone]  Fax. [address] [facility], [address] [place] [address]  Tel.  [phone]  Fax. [phone] [facility], [address] [facility]  Tel.  [phone]  Fax. [phone]         Subjective:      Can Amin is an 58 y.o. female self-referred for evaluation for a sleep disorder. She complains of snoring associated with excessive daytime sleepiness, difficulty falling asleep once awakened. Symptoms began several years ago, gradually worsening since that time. She usually can fall asleep in 60 minutes. Family or house members note snoring. She denies falling asleep during conversation. She had a home sleep test a few years ago which was positive for sleep apnea. She said before she could get the CPAP the place close down. Results are not available. Since that time she has lost almost 50 pounds through diet. Nithya Amin does wake up frequently at night. She is bothered by waking up too early and left unable to get back to sleep. She actually sleeps about 3 hours at night and wakes up about 2 times during the night. She   work shifts: [employer] Narayanan indicates she does get too little sleep at night. Her bedtime is 2300. She awakens at 0400 (1-4). She does not take naps. . She has the following observed behaviors: Loud snoring;  . Other remarks:  She gets up at 4 AM to do her lymphedema treatment. She has to be at work at 7 AM.  She often falls asleep after her lymphedema treatment but takes about 20 minutes. She dozes off in front of her computer during the daytime and if watching TV. She does tend to watch TV in bed. She does have a bed that elevates.     Sleep Medicine 8/22/2023   Sitting and reading 2   Watching TV 1   Sitting, inactive in a public place (e.g. a theatre or a meeting) 1   As a passenger in a car for an hour without a break 3   Lying down to rest in the afternoon when circumstances

## 2023-10-02 ENCOUNTER — PROCEDURE VISIT (OUTPATIENT)
Age: 63
End: 2023-10-02

## 2023-10-02 ENCOUNTER — HOSPITAL ENCOUNTER (OUTPATIENT)
Facility: HOSPITAL | Age: 63
Discharge: HOME | End: 2023-10-05
Payer: COMMERCIAL

## 2023-10-02 DIAGNOSIS — G47.33 OBSTRUCTIVE SLEEP APNEA (ADULT) (PEDIATRIC): Primary | ICD-10-CM

## 2023-10-02 PROCEDURE — G0400 HOME SLEEP TEST/TYPE 4 PORTA: HCPCS | Performed by: INTERNAL MEDICINE

## 2023-10-02 NOTE — PROGRESS NOTES
[address] [facility].[place], [address]  Tel.  [phone]  Fax. [address] [facility], [address] [place] [address]  Tel.  [phone]  Fax. [phone] [address]  Tel.  [phone]  Fax. [phone] [facility] Alia is seen today to receive a WatchPAT home sleep apnea testing (HSAT) device. The WatchPAT HSAT Agreement was reviewed and endorsed by patient. General information regarding operation of the HSAT device was provided. Patient was advised to watch the [facility] instructional video. Patient was advised to contact patient support for any problems using the device. Patient was advised to complete the Morning Questionnaire after awakening/ending the test.    There were no vitals taken for this visit. Patient will return the home sleep testing unit by noon unless otherwise approved. Patient will be contacted once the results have been reviewed by the physician, typically within 10-15 business days.     CopinyT Serial Number [id number]

## 2023-10-04 ENCOUNTER — TELEPHONE (OUTPATIENT)
Age: 63
End: 2023-10-04

## 2023-10-04 DIAGNOSIS — G47.33 OBSTRUCTIVE SLEEP APNEA (ADULT) (PEDIATRIC): Primary | ICD-10-CM

## 2023-10-12 NOTE — TELEPHONE ENCOUNTER
HSAT in r-drive. Tech to convey results to patient    Bhakti Cisneros positive for significant sleep apnea. AHI 11/hour and lowest oxygen saturation was 85%. (There were more milder events based on a 3% oxygen desaturation with AHI at 25/hour). We had discussed treatment options at initial consultation. Based on the results of the home sleep apnea test,  a trial of APAP would be an effective mode of therapy. APAP order attached. she should be seen in the sleep disorder center 4-6 weeks after initiating PAP therapy. Patient should call the office the day she gets set up with new PAP device so we can schedule her for an adherence/compliance visit within 31-90 days of obtaining a new device. Front staff to Order PAP and call patient and let them know which DME company they should be hearing from after results reviewed with lead support technologist.     she will need a first adherence visit.

## 2023-10-13 ENCOUNTER — CLINICAL DOCUMENTATION (OUTPATIENT)
Age: 63
End: 2023-10-13

## 2023-10-24 ENCOUNTER — HOSPITAL ENCOUNTER (OUTPATIENT)
Facility: HOSPITAL | Age: 63
Setting detail: RECURRING SERIES
Discharge: HOME | End: 2023-10-27
Payer: COMMERCIAL

## 2023-10-24 VITALS — BODY MASS INDEX: 41 KG/M2 | WEIGHT: 222.8 LBS | HEIGHT: 62 IN

## 2023-10-24 PROCEDURE — 97161 PT EVAL LOW COMPLEX 20 MIN: CPT

## 2023-10-24 PROCEDURE — 97760 ORTHOTIC MGMT&TRAING 1ST ENC: CPT

## 2023-10-24 NOTE — THERAPY EVALUATION
[facility]  a part of [address], [facility] [address]  Phone: [phone]    Fax: [phone]                                                                                PT/OT LYMPHEDEMA - EVALUATION/PLAN OF CARE NOTE (updated 3/23)      Date: 10/24/2023          Patient Name:  Michelet Sanchez :  1960   Medical   Diagnosis:  Lymphedema, not elsewhere classified [I89.0] Treatment Diagnosis:  I89.0     Lymphedema, not elsewhere classified    Referral Source:  Bry Saavedra MD Provider #:  [id number]                Insurance: Payor: Bairon Hines / Plan: PRITESH PPO / Product Type: *No Product type* /      Patient  verified yes     Visit #   Current  / Total 1 1   Time   In / Out 0915 1030   Total Treatment Time 75   Total Timed Codes 50         SUBJECTIVE  Pain Level (0-10 scale): 0 out of 10 numeric scale  []constant []intermittent []improving []worsening []no change since onset    Any medication changes, allergies to medications, adverse drug reactions, diagnosis change, or new procedure performed?: [x] No    [] Yes (see summary sheet for update)  Medications: Verified on Patient Summary List    Subjective functional status/changes:     I fell in August- I caught my toe and fell on my face. I had spasms in the back of my head and neck afterwards. I am getting shots in my knees. I am planning on retiring in April. Start of Care: 10/24/2023  Onset Date: :Referred 3/2017. Patient reports swelling has been present for years since she was young, but she was first diagnosed 2015. Current symptoms/Complaints: leg and lower body swelling  Mechanism of Injury:   Patient wears day and night compression daily. She is primarily wearing the [address] with her custom flat-knit Juzo expert knee highs and now prefers this product over a flat-knit elif. Has advanced vasopneumatic device and using daily.    She reports a

## 2024-02-15 ENCOUNTER — OFFICE VISIT (OUTPATIENT)
Age: 64
End: 2024-02-15
Payer: COMMERCIAL

## 2024-02-15 ENCOUNTER — CLINICAL DOCUMENTATION (OUTPATIENT)
Age: 64
End: 2024-02-15

## 2024-02-15 VITALS
SYSTOLIC BLOOD PRESSURE: 128 MMHG | TEMPERATURE: 98 F | DIASTOLIC BLOOD PRESSURE: 68 MMHG | OXYGEN SATURATION: 96 % | BODY MASS INDEX: 42.57 KG/M2 | WEIGHT: 231.3 LBS | HEIGHT: 62 IN | HEART RATE: 66 BPM

## 2024-02-15 DIAGNOSIS — I10 PRIMARY HYPERTENSION: ICD-10-CM

## 2024-02-15 DIAGNOSIS — G47.33 OBSTRUCTIVE SLEEP APNEA (ADULT) (PEDIATRIC): Primary | ICD-10-CM

## 2024-02-15 PROCEDURE — 3074F SYST BP LT 130 MM HG: CPT | Performed by: NURSE PRACTITIONER

## 2024-02-15 PROCEDURE — 3078F DIAST BP <80 MM HG: CPT | Performed by: NURSE PRACTITIONER

## 2024-02-15 PROCEDURE — 99213 OFFICE O/P EST LOW 20 MIN: CPT | Performed by: NURSE PRACTITIONER

## 2024-02-15 NOTE — PROGRESS NOTES
[address]   [place] [address]   Tel.  [phone]   Fax. [phone]  [address]   [place] [address]   Tel.  [phone]   Fax. [phone] [address] [place] [address]   Tel.  [phone]   Fax. [phone]     Nithya Sanchez (: 1960) is a 63 y.o. female, established patient, seen for positive airway pressure follow-up and evaluation.  She was last seen by Dr. Hester on 2023, prior notes reviewed in detail.  Home sleep test 10/2023 showed AHI of 11/hr with a lowest SaO2 of 85%.    ASSESSMENT/PLAN:   Diagnosis Orders   1. Obstructive sleep apnea (adult) (pediatric)  DME Order for (Specify) as OP      2. Primary hypertension        3. BMI 40.0-44.9, adult (HCC)          AHI = 11 ().  On CPAP :  5-10 cmH2O. Set up 2023.    She is adherent with PAP therapy and PAP continues to benefit patient and remains necessary for control of her sleep apnea.     No follow-up provider specified.    Sleep Apnea - Continue on current pressures. She is currently waking up at 415 am for work and is very tired throughout the morning. She is retiring next month and notes that her sleep schedule will improve. We will follow up in 4 months regarding how she feels after skilled nursing.     Orders Placed This Encounter   Procedures    DME Order for (Specify) as OP     Diagnosis: (G47.33) DMITRIY (obstructive sleep apnea)  (primary encounter diagnosis)     Replacement Supplies for Positive Airway Pressure Therapy Device:   Duration of need: 99 months.      Nasal Pillows Combo Mask (Replace) 2 per month.   Nasal Pillows (Replace) 2 per month.    Nasal Cushion (Replace) 2 per month.   Nasal Interface Mask 1 every 3 months.       Headgear 1 every 6 months.   Positive Airway Pressure chinstrap 1 every 6 months.     Tubing with heating element 1 every 3 months.     Filter(s) Disposable 2 per month.   Filter(s)

## 2024-02-15 NOTE — PATIENT INSTRUCTIONS
drowsiness. Medications that impair a drivers attention should have a warning label. Alcohol naturally makes you sleepy and on its own can cause accidents. Combined with excessive drowsiness its effects are amplified.   Signs of Drowsy Driving:   * You don't remember driving the last few miles   * You may drift out of your felipe   * You are unable to focus and your thoughts wander   * You may yawn more often than normal   * You have difficulty keeping your eyes open / nodding off   * Missing traffic signs, speeding, or tailgating  Prevention-   Good sleep hygiene, lifestyle and behavioral choices have the most impact on drowsy driving. There is no substitute for sleep and the average person requires 8 hours nightly. If you find yourself driving drowsy, stop and sleep. Consider the sleep hygiene tips provided during your visit as well.     Medication Refill Policy: Refills for all medications require 1 week advance notice. Please have your pharmacy fax a refill request. We are unable to fax, or call in \"controled substance\" medications and you will need to pick these prescriptions up from our office.

## 2024-02-20 ENCOUNTER — TELEPHONE (OUTPATIENT)
Age: 64
End: 2024-02-20

## 2024-04-01 ENCOUNTER — HOSPITAL ENCOUNTER (OUTPATIENT)
Facility: HOSPITAL | Age: 64
Setting detail: RECURRING SERIES
Discharge: HOME | End: 2024-04-04
Payer: MEDICARE

## 2024-04-01 VITALS — BODY MASS INDEX: 42.03 KG/M2 | HEIGHT: 62 IN | WEIGHT: 228.4 LBS

## 2024-04-01 PROCEDURE — 97161 PT EVAL LOW COMPLEX 20 MIN: CPT

## 2024-04-01 PROCEDURE — 97535 SELF CARE MNGMENT TRAINING: CPT

## 2024-04-01 PROCEDURE — 97760 ORTHOTIC MGMT&TRAING 1ST ENC: CPT

## 2024-04-01 NOTE — THERAPY EVALUATION
[facility]  a part of [facility]   [address], [address]  [place] [address]  Phone: [phone]    Fax: [phone]                                                                                PT/OT LYMPHEDEMA - EVALUATION/PLAN OF CARE NOTE (updated 3/23)      Date: 2024          Patient Name:  Nithya Sanchez :  1960   Medical   Diagnosis:  Hereditary lymphedema [Q82.0] Treatment Diagnosis:  Q82.0     Hereditary lymphedema    Referral Source:  Alessandra Zaman MD Provider #:  [id number]                Insurance: Payor: MEDICARE / Plan: MEDICARE PART A AND B / Product Type: *No Product type* /      Patient  verified yes     Visit #   Current  / Total 1 1     Time   In / Out 1330 1445   Total Treatment Time 75   Total Timed Codes 35   1:1 Treatment Time 35      [facility] Totals Reminder:  bill using total billable   min of TIMED therapeutic procedures and modalities.   8-22 min = 1 unit; 23-37 min = 2 units; 38-52 min = 3 units;  53-67 min = 4 units; 68-82 min = 5 units         SUBJECTIVE  Pain Level (0-10 scale):  0 out of 10 numeric scale currently but pain increases to 6 out of 10 numeric scale bilateral legs left greater than right, sore and sharp pain when present, pain began about 2 months ago  []constant [x]intermittent []improving []worsening [x]no change since onset    Any medication changes, allergies to medications, adverse drug reactions, diagnosis change, or new procedure performed?: [x] No    [] Yes (see summary sheet for update)  Medications: Verified on Patient Summary List    Subjective functional status/changes:  The last couple of months I have been having leg pain and I can't get around like I want to and like I used to.  I see the knee surgeon tomorrow and I am ready to schedule a TKR.   My legs do feel heavy.   Start of Care: 2024  Onset Date: :Referred 3/2017. Patient reports swelling has been present for years since she was

## 2024-04-01 NOTE — THERAPY EVALUATION
Statement of Medical Necessity  Page 1 of 2      Nithya Fariastt 1960 Today's Date: 4/1/2024 KIKO: [employer]   Payor: MEDICARE / Plan: MEDICARE PART A AND B / Product Type: *No Product type* /  ME: TBD  Refills: 2                   Diagnosis  []   I97.2 Post-Mastectomy Lymphedema []   I87.2 Venous Insufficiency   []   I89.0 Lymphedema, other secondary  []   I83.019 Venous Stasis Ulcer LE, Right   []   I89.9 Unspecified Lymphatic Disorder []   I83.029 Venous Stasis Ulcer LE, Left   []   R60.9 Swelling not relieved by elevation [x]   Q82.0 Hereditary/ Congenital Lymphedema   []   C50.211 Malignant neoplasm of breast, Right []   G89.3  Cancer associated pain   []   C50.212 Malignant neoplasm of breast, Left []   L03.115 LE Cellulitis, Right   []    []   L03.116 LE Cellulitis, Left                                   Nithya Fariastt    1960  Page 2 of 2    Physician Order for DME for Diagnosis of bilateral lower extremity lymphedema as Listed on Statement of Medical Necessity, Page 1         Recommended Product:  Units Upper Extremity Rt Lt Units Lower Extremity Rt Lt    Circ-Aid, Ready Wrap, Sigvaris Arm    Inelastic binders (Circ-Aid, Marshall)  []Foot   []Below Knee   []Knee   []Thigh      Circ-Aid Ready Wrap, Sigvaris hand    Nicholas Fam, night use []Full Leg  []Lower Leg      Tribute Arm, night use    Tribute, night use  []Full Leg  []Lower Leg      Nicholas Fam Arm, night use    Mike Sleeve Leg/ Foot, night use      Gradiant Compression Sleeves  []Custom [] RM Arm:  []CCL1 []CCL2 []CCL3  []Custom [] RM:  []Glove  []Gauntlet:                         []CCL1 []CCL2 []CCL3     6 Gradient Compression Stockings   [x]Custom  []RM Lower Extremity:   []CCL1       []CCL2         [x]CCL3   [x]Knee       []Thigh        []Waist Length x x    Mike sleeve arm w/ hand, night use    Tribute Wrap, night use      Compression Bra   6 Juzo foot glove  20-30mmHg, RM x x    Compression Vest   3 Waist length compression RM, to be worn with

## 2024-04-02 NOTE — H&P (VIEW-ONLY)
CARE TEAM:  Patient Care Team:  Ino Nieto FNP as PCP - General (Nurse Practitioner)      HISTORY OF PRESENT ILLNESS  Chief Complaint: Pain of the Left Knee   Age: 63 y.o.    Sex: female   Hand-dominance: right    History of present illness: Ms. Sanchez presents today for evaluation of bilateral knee pain, L > R. Pt denies inciting event or history of trauma to the affected knee.  Pt reports knee pain described as constant, aching, and throbbing.  The patient locates the pain: global. The patient does not experience groin or hip pain.  The patient does not experience instability. The patient does experience mechanical symptoms about the knee.  The pain is affecting the patient's ADLs and ability to sleep through the night.  The patient's symptoms/pain have progressed despite attempts at conservative management including: RICE, activity modification, NSAIDs, Tylenol, low impact exercise, IASI.   The patient denies any recent fever, chills, night sweats or calf pain.     The past medical history, social history, medications and allergies were updated and reviewed during this visit.     OBJECTIVE  Constitutional:  No acute distress. Her body mass index is unknown because there is no height or weight on file.   Eyes:  Sclera are nonicteric.  Respiratory:  No labored breathing.  Cardiovascular:  No marked edema.  Skin:  No marked skin ulcers.  Neurological:  No marked sensory loss noted.  Psychiatric: Alert and oriented x3.    left KNEE EXAM:  APPEARANCE- No redness, swelling or inflammation.  mild effusion.  ALIGNMENT- moderate varus deformity that is correctable  STABILITY-  Negative posterior and anterior drawer sign. Minimal varus/ valgus laxity noted.  ROM-  ROM is 3 to 110 degrees.   STRENGTH/FUNCTION- 5/5 strength with flexion and extension   PERFUSION/SENSATION- Palpable distal pulses, sensation and capillary refill on the lower extremity.   GAIT- antalgic  OTHER-  medial joint line TTP noted. Positive for

## 2024-04-05 ENCOUNTER — HOSPITAL ENCOUNTER (OUTPATIENT)
Facility: HOSPITAL | Age: 64
End: 2024-04-05
Payer: MEDICARE

## 2024-04-05 DIAGNOSIS — M17.12 ARTHRITIS OF LEFT KNEE: ICD-10-CM

## 2024-04-05 PROCEDURE — 73700 CT LOWER EXTREMITY W/O DYE: CPT

## 2024-04-16 ENCOUNTER — HOSPITAL ENCOUNTER (OUTPATIENT)
Facility: HOSPITAL | Age: 64
Discharge: HOME | End: 2024-04-19
Payer: MEDICARE

## 2024-04-16 VITALS
WEIGHT: 232.37 LBS | RESPIRATION RATE: 14 BRPM | TEMPERATURE: 98.1 F | HEART RATE: 85 BPM | OXYGEN SATURATION: 100 % | DIASTOLIC BLOOD PRESSURE: 74 MMHG | BODY MASS INDEX: 42.76 KG/M2 | HEIGHT: 62 IN | SYSTOLIC BLOOD PRESSURE: 166 MMHG

## 2024-04-16 LAB
ABO + RH BLD: NORMAL
BLOOD GROUP ANTIBODIES SERPL: NORMAL
SPECIMEN EXP DATE BLD: NORMAL

## 2024-04-16 PROCEDURE — 86901 BLOOD TYPING SEROLOGIC RH(D): CPT

## 2024-04-16 PROCEDURE — 86850 RBC ANTIBODY SCREEN: CPT

## 2024-04-16 PROCEDURE — 97530 THERAPEUTIC ACTIVITIES: CPT

## 2024-04-16 PROCEDURE — 86900 BLOOD TYPING SEROLOGIC ABO: CPT

## 2024-04-16 PROCEDURE — 36415 COLL VENOUS BLD VENIPUNCTURE: CPT

## 2024-04-16 PROCEDURE — APPNB30 APP NON BILLABLE TIME 0-30 MINS: Performed by: NURSE PRACTITIONER

## 2024-04-16 PROCEDURE — 97161 PT EVAL LOW COMPLEX 20 MIN: CPT

## 2024-04-16 RX ORDER — CHLORTHALIDONE 25 MG/1
25 TABLET ORAL DAILY
COMMUNITY

## 2024-04-16 RX ORDER — FLUTICASONE PROPIONATE 50 MCG
1 SPRAY, SUSPENSION (ML) NASAL NIGHTLY
COMMUNITY

## 2024-04-16 RX ORDER — ACETAMINOPHEN 500 MG
500 TABLET ORAL EVERY 6 HOURS PRN
COMMUNITY

## 2024-04-16 RX ORDER — MELOXICAM 15 MG/1
15 TABLET ORAL AS NEEDED
COMMUNITY

## 2024-04-16 RX ORDER — ACETAMINOPHEN 500 MG
1000 TABLET ORAL ONCE
OUTPATIENT
Start: 2024-04-24

## 2024-04-16 RX ORDER — SODIUM CHLORIDE, SODIUM LACTATE, POTASSIUM CHLORIDE, CALCIUM CHLORIDE 600; 310; 30; 20 MG/100ML; MG/100ML; MG/100ML; MG/100ML
INJECTION, SOLUTION INTRAVENOUS ONCE
OUTPATIENT
Start: 2024-04-24

## 2024-04-16 RX ORDER — AMOXICILLIN 500 MG
CAPSULE ORAL DAILY
COMMUNITY

## 2024-04-16 RX ORDER — TROSPIUM CHLORIDE 20 MG/1
20 TABLET, FILM COATED ORAL 2 TIMES DAILY
COMMUNITY

## 2024-04-16 RX ORDER — CELECOXIB 200 MG/1
200 CAPSULE ORAL ONCE
OUTPATIENT
Start: 2024-04-24

## 2024-04-16 ASSESSMENT — KOOS JR
RISING FROM SITTING: SEVERE
BENDING TO THE FLOOR TO PICK UP OBJECT: MODERATE
KOOS JR TOTAL INTERVAL SCORE: 42.281
STANDING UPRIGHT: SEVERE
TWISING OR PIVOTING ON KNEE: MODERATE
HOW SEVERE IS YOUR KNEE STIFFNESS AFTER FIRST WAKING IN MORNING: SEVERE
STRAIGHTENING KNEE FULLY: MODERATE
GOING UP OR DOWN STAIRS: SEVERE

## 2024-04-16 ASSESSMENT — PROMIS GLOBAL HEALTH SCALE
WHO IS THE PERSON COMPLETING THE PROMIS V1.1 SURVEY?: SELF
SUM OF RESPONSES TO QUESTIONS 2, 4, 5, & 10: 16
IN GENERAL, HOW WOULD YOU RATE YOUR SATISFACTION WITH YOUR SOCIAL ACTIVITIES AND RELATIONSHIPS [ON A SCALE OF 1 (POOR) TO 5 (EXCELLENT)]?: GOOD
TO WHAT EXTENT ARE YOU ABLE TO CARRY OUT YOUR EVERYDAY PHYSICAL ACTIVITIES SUCH AS WALKING, CLIMBING STAIRS, CARRYING GROCERIES, OR MOVING A CHAIR [ON A SCALE OF 1 (NOT AT ALL) TO 5 (COMPLETELY)]?: MODERATELY
IN THE PAST 7 DAYS, HOW OFTEN HAVE YOU BEEN BOTHERED BY EMOTIONAL PROBLEMS, SUCH AS FEELING ANXIOUS, DEPRESSED, OR IRRITABLE [ON A SCALE FROM 1 (NEVER) TO 5 (ALWAYS)]?: NEVER
IN GENERAL, PLEASE RATE HOW WELL YOU CARRY OUT YOUR USUAL SOCIAL ACTIVITIES (INCLUDES ACTIVITIES AT HOME, AT WORK, AND IN YOUR COMMUNITY, AND RESPONSIBILITIES AS A PARENT, CHILD, SPOUSE, EMPLOYEE, FRIEND, ETC) [ON A SCALE OF 1 (POOR) TO 5 (EXCELLENT)]?: GOOD
HOW IS THE PROMIS V1.1 BEING ADMINISTERED?: PAPER
IN GENERAL, WOULD YOU SAY YOUR QUALITY OF LIFE IS...[ON A SCALE OF 1 (POOR) TO 5 (EXCELLENT)]: VERY GOOD
IN THE PAST 7 DAYS, HOW WOULD YOU RATE YOUR PAIN ON AVERAGE [ON A SCALE FROM 0 (NO PAIN) TO 10 (WORST IMAGINABLE PAIN)]?: 7
IN GENERAL, HOW WOULD YOU RATE YOUR MENTAL HEALTH, INCLUDING YOUR MOOD AND YOUR ABILITY TO THINK [ON A SCALE OF 1 (POOR) TO 5 (EXCELLENT)]?: VERY GOOD
SUM OF RESPONSES TO QUESTIONS 3, 6, 7, & 8: 18
IN GENERAL, HOW WOULD YOU RATE YOUR PHYSICAL HEALTH [ON A SCALE OF 1 (POOR) TO 5 (EXCELLENT)]?: VERY GOOD
IN THE PAST 7 DAYS, HOW WOULD YOU RATE YOUR FATIGUE ON AVERAGE [ON A SCALE FROM 1 (NONE) TO 5 (VERY SEVERE)]?: MILD
IN GENERAL, WOULD YOU SAY YOUR HEALTH IS...[ON A SCALE OF 1 (POOR) TO 5 (EXCELLENT)]: VERY GOOD

## 2024-04-16 ASSESSMENT — PAIN DESCRIPTION - LOCATION: LOCATION: KNEE

## 2024-04-16 ASSESSMENT — PAIN SCALES - GENERAL: PAINLEVEL_OUTOF10: 3

## 2024-04-16 ASSESSMENT — PAIN DESCRIPTION - ORIENTATION: ORIENTATION: LEFT

## 2024-04-16 NOTE — PERIOP NOTE

## 2024-04-16 NOTE — PERIOP NOTE

## 2024-04-16 NOTE — PERIOP NOTE
[facility]  Joint/Spine Preoperative Instructions        Surgery Date 4/24/24          Time of Arrival:  To Be Called  Contact # [phone]    1. On the day of your surgery, please report to the Surgical Services Registration Desk and sign in at your designated time. The Surgery Center is located to the right of the Emergency Room.     2. You must have someone with you to drive you home. You should not drive a car for 24 hours following surgery. Please make arrangements for a friend or family member to stay with you for the first 24 hours after your surgery.    3. No food after midnight 4/23/24.  Medications morning of surgery should be taken with a sip of water.  Please follow pre-surgery drink instructions that were given at your Pre Admission Testing appointment.      4. We recommend you do not drink any alcoholic beverages for 24 hours before and after your surgery.    5. Contact your surgeon’s office for instructions on the following medications: non-steroidal anti-inflammatory drugs (i.e. Advil, Aleve), vitamins, and supplements. (Some surgeon’s will want you to stop these medications prior to surgery and others may allow you to take them)  **If you are currently taking Plavix, Coumadin, Aspirin and/or other blood-thinning agents, contact your surgeon for instructions.** Your surgeon will partner with the physician prescribing these medications to determine if it is safe to stop or if you need to continue taking.  Please do not stop taking these medications without instructions from your surgeon    6. Wear comfortable clothes.  Wear glasses instead of contacts.  Do not bring any money or jewelry. Please bring picture ID, insurance card, and any prearranged co-payment or hospital payment.  Do not wear make-up, particularly mascara the morning of your surgery.  Do not wear nail polish, particularly if you are having foot /hand surgery.  Wear your hair loose or down, no

## 2024-04-17 LAB
BACTERIA SPEC CULT: NORMAL
BACTERIA SPEC CULT: NORMAL
SERVICE CMNT-IMP: NORMAL

## 2024-04-17 NOTE — PROGRESS NOTES
[facility]  Physical Therapy Pre-surgery evaluation  [address] [place] [address]    PHYSICAL THERAPY PRE TKR SURGERY EVALUATION    Date: 2024  Patient: Nithya Sanchez (63 y.o. female)  : 1960  Medical Diagnosis: Encounter for other preprocedural examination [Z01.818]  Procedure(s) (LRB):  LEFT TOTAL KNEE ARTHROPLASTY WITH TRACEY (Left)     Treatment Diagnosis: M25.562  LEFT KNEE PAIN    Referral Source: Edgar Pope MD  Provider #: Edgar Pope   NPI#:  [id number]   Precautions:    None    ASSESSMENT :  Based on the objective data described below, the patient presents with impaired gait, balance, pain, and overall high level functional mobility due to end stage degenerative joint disease in the left knee.     Discussed anticipated disposition to home with possible discharge within a 0 to 2 day time frame post-surgery. Patient's  was not present for the session. Patient in agreement. Reports her spouse and sister will be able to provide assist as needed at discharge.    The patient indicated he is interested to discharge day of surgery if all discharge criteria met. Patient voiced good  understanding of therapy specific criteria to discharge day of surgery. Patient with good potential for discharging same day of surgery based on social support and current condition.    GOALS: (Goals have been discussed and agreed upon with patient.)  DISCHARGE GOALS: Time Frame: 1 DAY  Patient will demonstrate increased strength, range of motion, and pain control via a home exercise program in order to minimize functional deficits in preparation for their upcoming surgery. This will be achieved by using education, demonstration and through the use of an informational handout including a home exercise program.  REHABILITATION POTENTIAL FOR STATED GOALS: Good       RECOMMENDATIONS AND PLANNED INTERVENTIONS: (Benefits and precautions of physical therapy have been discussed with 
Patient attended the Joint Replacement Education Class at [facility]. The content of the class was presented using a power point presentation specific for patients undergoing hip and knee replacement surgery. The [facility] [place] Joint Replacement Education Handbook was given to the patient.  Preparing for surgery, day of surgery routine and expectations, discharge process and help at home expectations, nutrition,medications, infection control, pain management, DVT prevention, ice therapy and safety were reviewed in class. Opportunity for questions provided, patient verbalized understanding of instructions.    PROMis and KOOS Questionnaires completed on 4/16/2024.  [employer] access (active/pending/declined) Pending.    Prehab completed during PAT visit on 4/16/2024.  Patient reports does not have RW for after surgery.    Order placed through [employer] to obtain RW from [employer] on DOS 4/24/2024.    
SCORE    Stiffness - The following question concerns the amount of joing stiffness you have experienced during the last week in your knee. Stiffness is a sensation of restriction or slowness in the ease with which you move your knee joint.  How severe is your knee stiffness after first wakening in the morning?: Severe    Pain - What amount of knee pain have you experienced the last week during the following activities?  Twisting/pivoting on your knee: Moderate  Straightening knee fully: Moderate  Going up or down stairs: Severe  Standing upright: Severe    Function - Please indicate the degree of difficulty you have experienced in the last week due to your knee.  Rising from sitting: Severe  Bending to floor/ an object: Moderate    Raw Score  Jr SHELBY. Knee Survey Score: 18  KOOS JR Total Interval Score (0-100 Scale): 42.281

## 2024-04-23 ENCOUNTER — ANESTHESIA EVENT (OUTPATIENT)
Facility: HOSPITAL | Age: 64
End: 2024-04-23
Payer: MEDICARE

## 2024-04-23 NOTE — DISCHARGE INSTRUCTIONS
Discharge Instructions:  Nithya Sanchez    Surgery: TOTAL KNEE REPLACEMENT.        To relieve pain:  Use ice/gel packs.    -Put the ice pack directly over the wound, or anywhere you are hurting or swollen.   -To control pain and swelling, keep ice on regularly, especially after physical activity.  -The packs should stay cold for 3-4 hours.  When it is not cold anymore, rotate with the packs in the freezer.      Elevate your leg.  This will also keep swelling down.    Rest for at least 20 minutes between activity or exercises.    To keep track of your pain medications, write down what you take and when you take it.    The last dose of pain medication you got in the hospital was:     Medication    Dose    Date & Time      Choose your medications based on the pain scale below:    To keep your pain under control, take Tylenol every 6 hours for 14 days - even if you feel like you don’t need it.     For mild to moderate pain (4-6 on pain scale), take one pain pill every 4 hours or as instructed.     For severe pain (7-10 on pain scale), take two pain pills every 4 hours or as instructed.     To prevent nausea, take your pain medications with food.                                            Pain Scale              As your pain lessens:    Slowly start taking less pain medication. You may do this by waiting longer between doses or by taking smaller doses.    Stop using the pain medications as soon as you no longer need it, usually in 2-3 weeks.        Aspirin  To prevent blood clots, you will need to take Aspirin 81 mg twice a day for 30 days.              To prevent stomach upset or bleeding:  Take Pepcid 20 mg twice a day, or a similar home medication, while you are taking a blood thinner.         Keep your waterproof dressing in place. It will be removed by your surgeon during your follow-up appointment in 2 weeks.     You may need to change the dressing if you are having drainage to where the dressing is no longer

## 2024-04-24 ENCOUNTER — HOSPITAL ENCOUNTER (OUTPATIENT)
Facility: HOSPITAL | Age: 64
Setting detail: OBSERVATION
Discharge: HOME | End: 2024-04-25
Attending: ORTHOPAEDIC SURGERY | Admitting: ORTHOPAEDIC SURGERY
Payer: MEDICARE

## 2024-04-24 ENCOUNTER — APPOINTMENT (OUTPATIENT)
Facility: HOSPITAL | Age: 64
End: 2024-04-24
Attending: ORTHOPAEDIC SURGERY
Payer: MEDICARE

## 2024-04-24 ENCOUNTER — ANESTHESIA (OUTPATIENT)
Facility: HOSPITAL | Age: 64
End: 2024-04-24
Payer: MEDICARE

## 2024-04-24 DIAGNOSIS — M17.12 PRIMARY OSTEOARTHRITIS OF LEFT KNEE: Primary | ICD-10-CM

## 2024-04-24 PROCEDURE — 64447 NJX AA&/STRD FEMORAL NRV IMG: CPT | Performed by: ANESTHESIOLOGY

## 2024-04-24 PROCEDURE — 73560 X-RAY EXAM OF KNEE 1 OR 2: CPT

## 2024-04-24 PROCEDURE — 2500000003 HC RX 250 WO HCPCS

## 2024-04-24 PROCEDURE — G0378 HOSPITAL OBSERVATION PER HR: HCPCS

## 2024-04-24 PROCEDURE — 6360000002 HC RX W HCPCS: Performed by: ORTHOPAEDIC SURGERY

## 2024-04-24 PROCEDURE — 2580000003 HC RX 258: Performed by: ORTHOPAEDIC SURGERY

## 2024-04-24 PROCEDURE — 97116 GAIT TRAINING THERAPY: CPT

## 2024-04-24 PROCEDURE — 97161 PT EVAL LOW COMPLEX 20 MIN: CPT

## 2024-04-24 PROCEDURE — 6360000002 HC RX W HCPCS: Performed by: ANESTHESIOLOGY

## 2024-04-24 PROCEDURE — 2500000003 HC RX 250 WO HCPCS: Performed by: ORTHOPAEDIC SURGERY

## 2024-04-24 PROCEDURE — 6370000000 HC RX 637 (ALT 250 FOR IP): Performed by: ORTHOPAEDIC SURGERY

## 2024-04-24 PROCEDURE — 2580000003 HC RX 258

## 2024-04-24 PROCEDURE — 6360000002 HC RX W HCPCS

## 2024-04-24 PROCEDURE — 97530 THERAPEUTIC ACTIVITIES: CPT

## 2024-04-24 RX ORDER — ACETAMINOPHEN 500 MG
1000 TABLET ORAL EVERY 8 HOURS SCHEDULED
Status: DISCONTINUED | OUTPATIENT
Start: 2024-04-24 | End: 2024-04-25 | Stop reason: HOSPADM

## 2024-04-24 RX ORDER — AMLODIPINE BESYLATE 5 MG/1
10 TABLET ORAL DAILY
Status: DISCONTINUED | OUTPATIENT
Start: 2024-04-25 | End: 2024-04-25 | Stop reason: HOSPADM

## 2024-04-24 RX ORDER — POLYETHYLENE GLYCOL 3350 17 G/17G
17 POWDER, FOR SOLUTION ORAL DAILY
Status: DISCONTINUED | OUTPATIENT
Start: 2024-04-24 | End: 2024-04-25 | Stop reason: HOSPADM

## 2024-04-24 RX ORDER — TRANEXAMIC ACID 100 MG/ML
INJECTION, SOLUTION INTRAVENOUS PRN
Status: DISCONTINUED | OUTPATIENT
Start: 2024-04-24 | End: 2024-04-24 | Stop reason: ALTCHOICE

## 2024-04-24 RX ORDER — OXYCODONE HYDROCHLORIDE 5 MG/1
5 TABLET ORAL
Status: DISCONTINUED | OUTPATIENT
Start: 2024-04-24 | End: 2024-04-24 | Stop reason: HOSPADM

## 2024-04-24 RX ORDER — SODIUM CHLORIDE, SODIUM LACTATE, POTASSIUM CHLORIDE, CALCIUM CHLORIDE 600; 310; 30; 20 MG/100ML; MG/100ML; MG/100ML; MG/100ML
INJECTION, SOLUTION INTRAVENOUS ONCE
Status: DISCONTINUED | OUTPATIENT
Start: 2024-04-24 | End: 2024-04-24 | Stop reason: HOSPADM

## 2024-04-24 RX ORDER — SENNA AND DOCUSATE SODIUM 50; 8.6 MG/1; MG/1
1 TABLET, FILM COATED ORAL 2 TIMES DAILY
Status: DISCONTINUED | OUTPATIENT
Start: 2024-04-24 | End: 2024-04-25 | Stop reason: HOSPADM

## 2024-04-24 RX ORDER — CLONIDINE HYDROCHLORIDE 0.1 MG/1
0.1 TABLET ORAL 2 TIMES DAILY
Status: DISCONTINUED | OUTPATIENT
Start: 2024-04-25 | End: 2024-04-25 | Stop reason: HOSPADM

## 2024-04-24 RX ORDER — CHLORTHALIDONE 25 MG/1
25 TABLET ORAL DAILY
Status: DISCONTINUED | OUTPATIENT
Start: 2024-04-25 | End: 2024-04-25 | Stop reason: HOSPADM

## 2024-04-24 RX ORDER — PHENYLEPHRINE HCL IN 0.9% NACL 0.4MG/10ML
SYRINGE (ML) INTRAVENOUS PRN
Status: DISCONTINUED | OUTPATIENT
Start: 2024-04-24 | End: 2024-04-24 | Stop reason: SDUPTHER

## 2024-04-24 RX ORDER — MORPHINE SULFATE 2 MG/ML
2 INJECTION, SOLUTION INTRAMUSCULAR; INTRAVENOUS
Status: ACTIVE | OUTPATIENT
Start: 2024-04-24 | End: 2024-04-25

## 2024-04-24 RX ORDER — HYDRALAZINE HYDROCHLORIDE 20 MG/ML
10 INJECTION INTRAMUSCULAR; INTRAVENOUS
Status: DISCONTINUED | OUTPATIENT
Start: 2024-04-24 | End: 2024-04-24 | Stop reason: HOSPADM

## 2024-04-24 RX ORDER — SODIUM CHLORIDE 0.9 % (FLUSH) 0.9 %
5-40 SYRINGE (ML) INJECTION EVERY 12 HOURS SCHEDULED
Status: DISCONTINUED | OUTPATIENT
Start: 2024-04-24 | End: 2024-04-25 | Stop reason: HOSPADM

## 2024-04-24 RX ORDER — ONDANSETRON 4 MG/1
4 TABLET, ORALLY DISINTEGRATING ORAL EVERY 8 HOURS PRN
Status: DISCONTINUED | OUTPATIENT
Start: 2024-04-24 | End: 2024-04-25 | Stop reason: HOSPADM

## 2024-04-24 RX ORDER — ASPIRIN 81 MG/1
81 TABLET ORAL 2 TIMES DAILY
Status: DISCONTINUED | OUTPATIENT
Start: 2024-04-24 | End: 2024-04-25 | Stop reason: HOSPADM

## 2024-04-24 RX ORDER — 0.9 % SODIUM CHLORIDE 0.9 %
500 INTRAVENOUS SOLUTION INTRAVENOUS ONCE AS NEEDED
Status: DISCONTINUED | OUTPATIENT
Start: 2024-04-24 | End: 2024-04-25 | Stop reason: HOSPADM

## 2024-04-24 RX ORDER — CEFAZOLIN SODIUM/WATER 2 G/20 ML
SYRINGE (ML) INTRAVENOUS PRN
Status: DISCONTINUED | OUTPATIENT
Start: 2024-04-24 | End: 2024-04-24 | Stop reason: SDUPTHER

## 2024-04-24 RX ORDER — SODIUM CHLORIDE 0.9 % (FLUSH) 0.9 %
5-40 SYRINGE (ML) INJECTION PRN
Status: DISCONTINUED | OUTPATIENT
Start: 2024-04-24 | End: 2024-04-24 | Stop reason: HOSPADM

## 2024-04-24 RX ORDER — DEXAMETHASONE SODIUM PHOSPHATE 4 MG/ML
INJECTION, SOLUTION INTRA-ARTICULAR; INTRALESIONAL; INTRAMUSCULAR; INTRAVENOUS; SOFT TISSUE PRN
Status: DISCONTINUED | OUTPATIENT
Start: 2024-04-24 | End: 2024-04-24 | Stop reason: SDUPTHER

## 2024-04-24 RX ORDER — TRANEXAMIC ACID 100 MG/ML
INJECTION, SOLUTION INTRAVENOUS PRN
Status: DISCONTINUED | OUTPATIENT
Start: 2024-04-24 | End: 2024-04-24 | Stop reason: SDUPTHER

## 2024-04-24 RX ORDER — SODIUM CHLORIDE 9 MG/ML
INJECTION, SOLUTION INTRAVENOUS PRN
Status: DISCONTINUED | OUTPATIENT
Start: 2024-04-24 | End: 2024-04-24 | Stop reason: HOSPADM

## 2024-04-24 RX ORDER — FAMOTIDINE 20 MG/1
20 TABLET, FILM COATED ORAL 2 TIMES DAILY
Status: DISCONTINUED | OUTPATIENT
Start: 2024-04-24 | End: 2024-04-25 | Stop reason: HOSPADM

## 2024-04-24 RX ORDER — CELECOXIB 200 MG/1
200 CAPSULE ORAL ONCE
Status: COMPLETED | OUTPATIENT
Start: 2024-04-24 | End: 2024-04-24

## 2024-04-24 RX ORDER — NALOXONE HYDROCHLORIDE 0.4 MG/ML
INJECTION, SOLUTION INTRAMUSCULAR; INTRAVENOUS; SUBCUTANEOUS PRN
Status: DISCONTINUED | OUTPATIENT
Start: 2024-04-24 | End: 2024-04-24 | Stop reason: HOSPADM

## 2024-04-24 RX ORDER — SODIUM CHLORIDE 0.9 % (FLUSH) 0.9 %
5-40 SYRINGE (ML) INJECTION PRN
Status: DISCONTINUED | OUTPATIENT
Start: 2024-04-24 | End: 2024-04-25 | Stop reason: HOSPADM

## 2024-04-24 RX ORDER — OXYCODONE HYDROCHLORIDE 5 MG/1
10 TABLET ORAL EVERY 4 HOURS PRN
Status: DISCONTINUED | OUTPATIENT
Start: 2024-04-24 | End: 2024-04-25 | Stop reason: HOSPADM

## 2024-04-24 RX ORDER — HYDROMORPHONE HYDROCHLORIDE 1 MG/ML
0.25 INJECTION, SOLUTION INTRAMUSCULAR; INTRAVENOUS; SUBCUTANEOUS EVERY 5 MIN PRN
Status: DISCONTINUED | OUTPATIENT
Start: 2024-04-24 | End: 2024-04-24 | Stop reason: HOSPADM

## 2024-04-24 RX ORDER — LOSARTAN POTASSIUM 100 MG/1
100 TABLET ORAL DAILY
Status: DISCONTINUED | OUTPATIENT
Start: 2024-04-25 | End: 2024-04-25 | Stop reason: HOSPADM

## 2024-04-24 RX ORDER — KETOROLAC TROMETHAMINE 30 MG/ML
15 INJECTION, SOLUTION INTRAMUSCULAR; INTRAVENOUS EVERY 6 HOURS
Status: DISCONTINUED | OUTPATIENT
Start: 2024-04-24 | End: 2024-04-25 | Stop reason: HOSPADM

## 2024-04-24 RX ORDER — SODIUM CHLORIDE 0.9 % (FLUSH) 0.9 %
5-40 SYRINGE (ML) INJECTION EVERY 12 HOURS SCHEDULED
Status: DISCONTINUED | OUTPATIENT
Start: 2024-04-24 | End: 2024-04-24 | Stop reason: HOSPADM

## 2024-04-24 RX ORDER — ONDANSETRON 2 MG/ML
4 INJECTION INTRAMUSCULAR; INTRAVENOUS EVERY 6 HOURS PRN
Status: DISCONTINUED | OUTPATIENT
Start: 2024-04-24 | End: 2024-04-25 | Stop reason: HOSPADM

## 2024-04-24 RX ORDER — SODIUM CHLORIDE, SODIUM LACTATE, POTASSIUM CHLORIDE, CALCIUM CHLORIDE 600; 310; 30; 20 MG/100ML; MG/100ML; MG/100ML; MG/100ML
INJECTION, SOLUTION INTRAVENOUS CONTINUOUS PRN
Status: DISCONTINUED | OUTPATIENT
Start: 2024-04-24 | End: 2024-04-24 | Stop reason: SDUPTHER

## 2024-04-24 RX ORDER — DEXAMETHASONE SODIUM PHOSPHATE 10 MG/ML
10 INJECTION, SOLUTION INTRAMUSCULAR; INTRAVENOUS ONCE
Status: COMPLETED | OUTPATIENT
Start: 2024-04-24 | End: 2024-04-24

## 2024-04-24 RX ORDER — DIPHENHYDRAMINE HCL 25 MG
25 CAPSULE ORAL EVERY 6 HOURS PRN
Status: DISCONTINUED | OUTPATIENT
Start: 2024-04-24 | End: 2024-04-25 | Stop reason: HOSPADM

## 2024-04-24 RX ORDER — FENTANYL CITRATE 50 UG/ML
25 INJECTION, SOLUTION INTRAMUSCULAR; INTRAVENOUS EVERY 5 MIN PRN
Status: DISCONTINUED | OUTPATIENT
Start: 2024-04-24 | End: 2024-04-24 | Stop reason: HOSPADM

## 2024-04-24 RX ORDER — ROPIVACAINE HYDROCHLORIDE 5 MG/ML
INJECTION, SOLUTION EPIDURAL; INFILTRATION; PERINEURAL PRN
Status: DISCONTINUED | OUTPATIENT
Start: 2024-04-24 | End: 2024-04-24 | Stop reason: ALTCHOICE

## 2024-04-24 RX ORDER — SODIUM CHLORIDE 9 MG/ML
INJECTION, SOLUTION INTRAVENOUS CONTINUOUS
Status: DISCONTINUED | OUTPATIENT
Start: 2024-04-24 | End: 2024-04-25 | Stop reason: HOSPADM

## 2024-04-24 RX ORDER — ONDANSETRON 2 MG/ML
INJECTION INTRAMUSCULAR; INTRAVENOUS PRN
Status: DISCONTINUED | OUTPATIENT
Start: 2024-04-24 | End: 2024-04-24 | Stop reason: SDUPTHER

## 2024-04-24 RX ORDER — PROCHLORPERAZINE EDISYLATE 5 MG/ML
5 INJECTION INTRAMUSCULAR; INTRAVENOUS
Status: DISCONTINUED | OUTPATIENT
Start: 2024-04-24 | End: 2024-04-24 | Stop reason: HOSPADM

## 2024-04-24 RX ORDER — ROPIVACAINE HYDROCHLORIDE 5 MG/ML
INJECTION, SOLUTION EPIDURAL; INFILTRATION; PERINEURAL
Status: COMPLETED | OUTPATIENT
Start: 2024-04-24 | End: 2024-04-24

## 2024-04-24 RX ORDER — DIPHENHYDRAMINE HYDROCHLORIDE 50 MG/ML
25 INJECTION INTRAMUSCULAR; INTRAVENOUS EVERY 6 HOURS PRN
Status: DISCONTINUED | OUTPATIENT
Start: 2024-04-24 | End: 2024-04-25 | Stop reason: HOSPADM

## 2024-04-24 RX ORDER — DEXAMETHASONE SODIUM PHOSPHATE 4 MG/ML
4 INJECTION, SOLUTION INTRA-ARTICULAR; INTRALESIONAL; INTRAMUSCULAR; INTRAVENOUS; SOFT TISSUE
Status: DISCONTINUED | OUTPATIENT
Start: 2024-04-24 | End: 2024-04-24 | Stop reason: HOSPADM

## 2024-04-24 RX ORDER — FENTANYL CITRATE 50 UG/ML
INJECTION, SOLUTION INTRAMUSCULAR; INTRAVENOUS PRN
Status: DISCONTINUED | OUTPATIENT
Start: 2024-04-24 | End: 2024-04-24 | Stop reason: SDUPTHER

## 2024-04-24 RX ORDER — ACETAMINOPHEN 325 MG/1
650 TABLET ORAL
Status: DISCONTINUED | OUTPATIENT
Start: 2024-04-24 | End: 2024-04-24 | Stop reason: HOSPADM

## 2024-04-24 RX ORDER — BISACODYL 10 MG
10 SUPPOSITORY, RECTAL RECTAL DAILY PRN
Status: DISCONTINUED | OUTPATIENT
Start: 2024-04-24 | End: 2024-04-25 | Stop reason: HOSPADM

## 2024-04-24 RX ORDER — METOPROLOL SUCCINATE 50 MG/1
50 TABLET, EXTENDED RELEASE ORAL DAILY
Status: DISCONTINUED | OUTPATIENT
Start: 2024-04-25 | End: 2024-04-25 | Stop reason: HOSPADM

## 2024-04-24 RX ORDER — TRANEXAMIC ACID 650 MG/1
1950 TABLET ORAL
Status: DISCONTINUED | OUTPATIENT
Start: 2024-04-25 | End: 2024-04-25 | Stop reason: HOSPADM

## 2024-04-24 RX ORDER — ACETAMINOPHEN 500 MG
1000 TABLET ORAL ONCE
Status: COMPLETED | OUTPATIENT
Start: 2024-04-24 | End: 2024-04-24

## 2024-04-24 RX ORDER — OXYCODONE HYDROCHLORIDE 5 MG/1
5 TABLET ORAL EVERY 4 HOURS PRN
Status: DISCONTINUED | OUTPATIENT
Start: 2024-04-24 | End: 2024-04-25 | Stop reason: HOSPADM

## 2024-04-24 RX ADMIN — SODIUM CHLORIDE, PRESERVATIVE FREE 10 ML: 5 INJECTION INTRAVENOUS at 22:33

## 2024-04-24 RX ADMIN — LIDOCAINE HYDROCHLORIDE 40 MG: 20 INJECTION, SOLUTION EPIDURAL; INFILTRATION; INTRACAUDAL; PERINEURAL at 12:17

## 2024-04-24 RX ADMIN — POLYETHYLENE GLYCOL 3350 17 G: 17 POWDER, FOR SOLUTION ORAL at 15:56

## 2024-04-24 RX ADMIN — SODIUM CHLORIDE, POTASSIUM CHLORIDE, SODIUM LACTATE AND CALCIUM CHLORIDE: 600; 310; 30; 20 INJECTION, SOLUTION INTRAVENOUS at 13:12

## 2024-04-24 RX ADMIN — PHENYLEPHRINE HYDROCHLORIDE 30 MCG/MIN: 10 INJECTION INTRAVENOUS at 12:51

## 2024-04-24 RX ADMIN — ROPIVACAINE HYDROCHLORIDE 20 ML: 5 INJECTION, SOLUTION EPIDURAL; INFILTRATION; PERINEURAL at 10:06

## 2024-04-24 RX ADMIN — MEPIVACAINE HYDROCHLORIDE 50 MG: 20 INJECTION, SOLUTION EPIDURAL; INFILTRATION at 10:00

## 2024-04-24 RX ADMIN — DEXAMETHASONE SODIUM PHOSPHATE 10 MG: 10 INJECTION, SOLUTION INTRAMUSCULAR; INTRAVENOUS at 15:54

## 2024-04-24 RX ADMIN — DEXAMETHASONE SODIUM PHOSPHATE 4 MG: 4 INJECTION INTRA-ARTICULAR; INTRALESIONAL; INTRAMUSCULAR; INTRAVENOUS; SOFT TISSUE at 12:17

## 2024-04-24 RX ADMIN — TRANEXAMIC ACID 1000 MG: 100 INJECTION, SOLUTION INTRAVENOUS at 12:20

## 2024-04-24 RX ADMIN — FENTANYL CITRATE 25 MCG: 50 INJECTION, SOLUTION INTRAMUSCULAR; INTRAVENOUS at 13:09

## 2024-04-24 RX ADMIN — SODIUM CHLORIDE, POTASSIUM CHLORIDE, SODIUM LACTATE AND CALCIUM CHLORIDE: 600; 310; 30; 20 INJECTION, SOLUTION INTRAVENOUS at 12:13

## 2024-04-24 RX ADMIN — ACETAMINOPHEN 1000 MG: 500 TABLET ORAL at 15:54

## 2024-04-24 RX ADMIN — ASPIRIN 81 MG: 81 TABLET, COATED ORAL at 22:27

## 2024-04-24 RX ADMIN — PROPOFOL 30 MG: 10 INJECTION, EMULSION INTRAVENOUS at 10:00

## 2024-04-24 RX ADMIN — PROPOFOL 100 MCG/KG/MIN: 10 INJECTION, EMULSION INTRAVENOUS at 12:13

## 2024-04-24 RX ADMIN — TRANEXAMIC ACID 1000 MG: 100 INJECTION, SOLUTION INTRAVENOUS at 13:12

## 2024-04-24 RX ADMIN — CELECOXIB 200 MG: 200 CAPSULE ORAL at 09:47

## 2024-04-24 RX ADMIN — SENNOSIDES AND DOCUSATE SODIUM 1 TABLET: 50; 8.6 TABLET ORAL at 22:28

## 2024-04-24 RX ADMIN — VANCOMYCIN HYDROCHLORIDE 1500 MG: 1.5 INJECTION, POWDER, LYOPHILIZED, FOR SOLUTION INTRAVENOUS at 09:51

## 2024-04-24 RX ADMIN — CEFAZOLIN 2000 MG: 1 INJECTION, POWDER, FOR SOLUTION INTRAMUSCULAR; INTRAVENOUS at 22:28

## 2024-04-24 RX ADMIN — Medication 2000 MG: at 12:19

## 2024-04-24 RX ADMIN — ACETAMINOPHEN 1000 MG: 500 TABLET ORAL at 09:47

## 2024-04-24 RX ADMIN — FAMOTIDINE 20 MG: 20 TABLET, FILM COATED ORAL at 22:27

## 2024-04-24 RX ADMIN — PROPOFOL 40 MG: 10 INJECTION, EMULSION INTRAVENOUS at 12:14

## 2024-04-24 RX ADMIN — Medication 40 MCG: at 12:42

## 2024-04-24 RX ADMIN — Medication 3 AMPULE: at 09:47

## 2024-04-24 RX ADMIN — ACETAMINOPHEN 1000 MG: 500 TABLET ORAL at 22:27

## 2024-04-24 RX ADMIN — Medication 40 MCG: at 12:39

## 2024-04-24 RX ADMIN — FENTANYL CITRATE 25 MCG: 50 INJECTION, SOLUTION INTRAMUSCULAR; INTRAVENOUS at 13:26

## 2024-04-24 RX ADMIN — ONDANSETRON 4 MG: 2 INJECTION INTRAMUSCULAR; INTRAVENOUS at 13:05

## 2024-04-24 RX ADMIN — KETOROLAC TROMETHAMINE 15 MG: 30 INJECTION, SOLUTION INTRAMUSCULAR at 22:28

## 2024-04-24 ASSESSMENT — PAIN DESCRIPTION - LOCATION: LOCATION: KNEE;LEG

## 2024-04-24 ASSESSMENT — PAIN SCALES - GENERAL
PAINLEVEL_OUTOF10: 0
PAINLEVEL_OUTOF10: 2
PAINLEVEL_OUTOF10: 0

## 2024-04-24 ASSESSMENT — PAIN DESCRIPTION - PAIN TYPE: TYPE: SURGICAL PAIN

## 2024-04-24 ASSESSMENT — PAIN DESCRIPTION - DESCRIPTORS: DESCRIPTORS: ACHING;THROBBING

## 2024-04-24 ASSESSMENT — PAIN - FUNCTIONAL ASSESSMENT: PAIN_FUNCTIONAL_ASSESSMENT: 0-10

## 2024-04-24 ASSESSMENT — PAIN DESCRIPTION - ORIENTATION: ORIENTATION: LEFT

## 2024-04-24 NOTE — INTERVAL H&P NOTE
Update History & Physical    The patient's History and Physical of 4/2/24was reviewed with the patient and I examined the patient. There was no change. The surgical site was confirmed by the patient and me.     Plan: The risks, benefits, expected outcome, and alternative to the recommended procedure have been discussed with the patient. Patient understands and wants to proceed with the procedure.     Electronically signed by Edgar Pope MD on 4/24/2024 at 9:02 AM

## 2024-04-24 NOTE — PROGRESS NOTES
Patient given RW from consignment for home use.   at bedside.    Discussed patient pain medications and other new medications patient prescribed on discharge from hospital with their side effects.  Discussed the importance of using other methods for pain control such as ice/rest/elevate, pre-medicating prior to physical therapy.  Discussed the importance of being safe at hospital and home by using RW until cleared by PT, wearing safe shoes, preparing home for after surgery and having a  to help at home.      Discussed risk after surgery and the importance of preventing infection by good hand hygiene, using clean towels and wash cloths at each shower, inspect wound/dressing daily, moving every two hours while awake, using the incentive spirometer every hour while awake.  When to call the doctor for help, or when to call 911 for emergency.    Discussed the importance of home PT, daily exercises as instructed by physical therapist and post-op appointment with surgeon and the need for transportation to this appointment until the surgeon has cleared you for driving.    Opportunity given for patient to ask additional questions about any special concerns regarding your care while on the Ortho unit.Patient states class information was valuable in preparing for surgery.

## 2024-04-24 NOTE — OP NOTE
OPERATIVE REPORT    FACILITY: [facility]    PATIENT NAME: Nithya Sanchez     DATE OF OPERATION: 4/24/24    PREOPERATIVE DIAGNOSIS: Left knee end stage osteoarthritis    POSTOPERATIVE DIAGNOSIS: same    SURGERIES PERFORMED:   Left total knee arthroplasty    ATTENDING PHYSICIAN: Edgar Pope MD    ASSISTANT: Ruddy LUCIANO    IMPLANTS:    Mark Triathlon cementless size 2 femur, 3 tibia, 11 mm CS poly, 31 patella    SPECIMENS:  none    OPERATIVE FINDINGS: End stage osteoarthritis. Stable total knee at conclusion.    ANESTHESIA: spinal plus regional    FLUIDS: Please see anesthesia record    ESTIMATED BLOOD LOSS: 10     TOURNIQUET TIME: 46 min     INDICATIONS FOR PROCEDURE: Nithya Sanchez is a 64 y.o. female who presented to clinic with left knee pain. Radiographs demonstrated advanced arthritic changes of the affected knee. They were counseled on the risks, benefits, and alternatives to surgery. Risks were outlined to include, but were not limited to: bleeding, infection, fracture, damage to blood vessels or nerves, hardware failure, loosening, continued pain, stiffness, leg length inequality, and medical risks including heart attack, stroke, blood clot, and even death. The patient elected to continue with the operation, and gave informed consent to do so.    DETAILED DESCRIPTION OF PROCEDURE:   The patient was identified in the preoperative holding area. The operative site was marked. The nature of procedure was reviewed and informed consent was confirmed. The patient was evaluated by anesthesia and a regional block was completed.  The patient was subsequently taken to the operating room and anesthesia was administered.  The patient was positioned supine and a nonsterile thigh tourniquet was placed. The lower extremity was prepped and draped in a standard fashion. Preoperative antibiotics were administered. A time-out was performed.     A standard midline incision was made. Sharp dissection was taken down to the

## 2024-04-24 NOTE — ANESTHESIA PROCEDURE NOTES
Spinal Block    End time: 4/24/2024 10:05 AM  Reason for block: primary anesthetic and at surgeon's request  Staffing  Performed: anesthesiologist   Anesthesiologist: Nilton Rush MD  Performed by: Nilton Rush MD  Authorized by: Nilton Rush MD    Spinal Block  Patient position: sitting  Prep: DuraPrep  Patient monitoring: cardiac monitor, continuous pulse ox, frequent blood pressure checks and oxygen  Approach: midline  Location: L4/L5  Provider prep: mask, sterile gloves and sterile gown  Needle  Needle type: Pencan   Needle gauge: 24 G  Needle length: 4 in  Assessment  Sensory level: T4  Swirl obtained: Yes  CSF: clear  Attempts: 1  Hemodynamics: stable  Preanesthetic Checklist  Completed: patient identified, IV checked, site marked, risks and benefits discussed, surgical/procedural consents, equipment checked, pre-op evaluation, timeout performed, anesthesia consent given, oxygen available, monitors applied/VS acknowledged, fire risk safety assessment completed and verbalized and blood product R/B/A discussed and consented

## 2024-04-24 NOTE — ANESTHESIA POSTPROCEDURE EVALUATION
Department of Anesthesiology  Postprocedure Note    Patient: Nithya Sanchez  MRN: [id number]  YOB: 1960  Date of evaluation: 4/24/2024    Procedure Summary       Date: 04/24/24 Room / Location: [facility] MAIN OR  / [facility] MAIN OR    Anesthesia Start: 1206 Anesthesia Stop: 1345    Procedure: LEFT TOTAL KNEE ARTHROPLASTY WITH MAKOPLASTY (Left: Knee) Diagnosis:       Primary osteoarthritis of left knee      (Primary osteoarthritis of left knee [M17.12])    Providers: Edgar Pope MD Responsible Provider: Nilton Rush MD    Anesthesia Type: MAC ASA Status: 2            Anesthesia Type: MAC    Judy Phase I: Judy Score: 10    Judy Phase II:      Anesthesia Post Evaluation    Patient location during evaluation: PACU  Patient participation: complete - patient participated  Level of consciousness: sleepy but conscious and responsive to verbal stimuli  Pain score: 2  Airway patency: patent  Nausea & Vomiting: no vomiting and no nausea  Cardiovascular status: blood pressure returned to baseline and hemodynamically stable  Respiratory status: acceptable  Hydration status: stable  Multimodal analgesia pain management approach  Pain management: adequate    No notable events documented.

## 2024-04-24 NOTE — ANESTHESIA PRE PROCEDURE
Department of Anesthesiology  Preprocedure Note       Name:  Nihtya Sanchez   Age:  64 y.o.  :  1960                                          MRN:  [id number]         Date:  2024      Surgeon: Surgeon(s):  Edgar Pope MD    Procedure: Procedure(s):  LEFT TOTAL KNEE ARTHROPLASTY WITH TRACEY    Medications prior to admission:   Prior to Admission medications    Medication Sig Start Date End Date Taking? Authorizing Provider   fluticasone (FLONASE) 50 MCG/ACT nasal spray 1 spray by Each Nostril route at bedtime    Rea Rivera MD   Omega-3 Fatty Acids (FISH OIL) 1200 MG CAPS Take by mouth daily    ProviderRea MD   Famotidine-Ca Carb-Mag Hydrox (PEPCID COMPLETE PO) Take by mouth as needed    Rea Rivera MD   trospium (SANCTURA) 20 MG tablet Take 1 tablet by mouth 2 times daily    Rea Rivera MD   chlorthalidone (HYGROTON) 25 MG tablet Take 1 tablet by mouth daily    Rea Rivera MD   meloxicam (MOBIC) 15 MG tablet Take 1 tablet by mouth as needed for Pain    ProviderRea MD   acetaminophen (TYLENOL) 500 MG tablet Take 1 tablet by mouth every 6 hours as needed for Pain    ProviderRea MD   Multiple Vitamin (MULTI-VITAMIN DAILY PO) Take by mouth daily    Rea Rivera MD   amLODIPine (NORVASC) 10 MG tablet Take 1 tablet by mouth daily    Automatic Reconciliation, Ar   aspirin 81 MG chewable tablet Take 1 tablet by mouth daily    Automatic Reconciliation, Ar   atorvastatin (LIPITOR) 40 MG tablet Take 2 tablets by mouth at bedtime    Automatic Reconciliation, Ar   Cholecalciferol 50 MCG (2000) CAPS Take 1 capsule by mouth daily    Automatic Reconciliation, Ar   cloNIDine (CATAPRES) 0.1 MG tablet Take 1 tablet by mouth 2 times daily    Automatic Reconciliation, Ar   losartan (COZAAR) 100 MG tablet Take 1 tablet by mouth daily    Automatic Reconciliation, Ar   metoprolol succinate (TOPROL XL) 50 MG extended release tablet Take 1 tablet

## 2024-04-24 NOTE — PROGRESS NOTES
End of Shift Note    Bedside shift change report given to TWIN Cooper (oncoming nurse) by Shereen Wolff LPN (offgoing nurse).  Report included the following information SBAR, Procedure Summary, Intake/Output, and MAR    Shift worked:  7a-7p     Shift summary and any significant changes:     POD 0. Pt resting comfortably in bed. Vital signs stable. Pain managed with PO tylenol. PRN oxycodone ordered, pt currently does not feel like she needs any. Ice pack applied to site. Voiding with purewick and bedside commode. PT and OT to see again in morning.     Concerns for physician to address:  Barriers for MT     Zone phone for oncoming shift:   [phone]       Activity:     Number times ambulated in hallways past shift: 0  Number of times OOB to chair past shift: 0    Cardiac:   Cardiac Monitoring: Yes           Access:  Current line(s): PIV     Genitourinary:   Urinary status: voiding and external catheter    Respiratory:      Chronic home O2 use?: NO  Incentive spirometer at bedside: YES       GI:     Current diet:  ADULT DIET; Regular  Passing flatus: YES  Tolerating current diet: YES       Pain Management:   Patient states pain is manageable on current regimen: YES    Skin:     Interventions: float heels, increase time out of bed, PT/OT consult, limit briefs, and internal/external urinary devices    Patient Safety:  Fall Score:    Interventions: bed/chair alarm, assistive device (walker, cane. etc), gripper socks, pt to call before getting OOB, stay with me (per policy), and gait belt       Length of Stay:  Expected LOS: 1  Actual LOS: 0      Shereen Wolff LPN

## 2024-04-24 NOTE — CARE COORDINATION
CM acknowledged receipt of consult to assist with home health or other discharge needs. Chart review completed, pt has had surgery, therapy evaluations are pending. CM following up with care team for recommendations re potential discharge needs and will discuss with pt as appropriate.    Maricel Bahena, [facility] [place]  [place] Management  [phone]

## 2024-04-24 NOTE — PROGRESS NOTES
Ortho / Neurosurgery Progress Note    POD# 0  s/p LEFT TOTAL KNEE ARTHROPLASTY WITH MAKOPLASTY   Pt seen with  at bedside. Pt is awake, alert and oriented x 3. Reports knee pain is 7/10. Will like to go home today. Voiding independently. Denies fever, chills, nausea, vomiting. Sob, chest or abdominal pain.     Patient in bed    VSS Afebrile.    Visit Vitals  /79   Pulse 88   Temp 97.9 °F (36.6 °C) (Oral)   Resp 16   Ht 1.575 m (5' 2\")   Wt 103.3 kg (227 lb 11.8 oz)   SpO2 97%   BMI 41.65 kg/m²       Voiding status: .Voiding independently.             Labs    No results found for: \"HGB\"   No results found for: \"INR\"   No results found for: \"NA\", \"K\", \"CL\", \"CO2\", \"GLU\", \"BUN\", \"CA\"  Recent Glucose Results: No results found for: \"GLUCOSE\"        Body mass index is 41.65 kg/m². : A BMI > 30 is classified as obesity and > 40 is classified as morbid obesity.     Awake and alert. No acute distress.    Dressing: Silver Dressing and Ace Wrap C.D.I.   No significant erythema or swelling  Cryotherapy in place over incision.   LLE sensation to light touch intact  LLE motor intact. Strength 5/5    SCD for mechanical DVT proph while in bed        PLAN:  1) PT BID - WBAT.   2) DVT Prophylaxis: Aspirin 81 mg BID for DVT Prophylaxis   3) GI Prophylaxis - pepcid  4) Pain control - scheduled tylenol  and toradol, and prn  oxycodone    5) Readiness for discharge:     [x] Vital Signs stable    [x] Labs stable    [x] + Voiding    [x] Wound intact, drainage minimal    [x] Tolerating PO intake     [] Cleared by PT (OT if applicable) for discharge   [x] Adequate pain control on oral medication alone       Discharge Plan: Discharge Plan: Home with Home Health     Discharge Needs: RW and HH           Pearl Kern PA-C, [facility] [place]    Orthopedic Physician Assistant

## 2024-04-24 NOTE — ANESTHESIA PROCEDURE NOTES
Peripheral Block    Patient location during procedure: procedure area  Reason for block: post-op pain management  Start time: 4/24/2024 10:06 AM  End time: 4/24/2024 10:09 AM  Staffing  Performed: anesthesiologist   Anesthesiologist: Nilton Rush MD  Performed by: Nilton Rush MD  Authorized by: Nilton Rush MD    Preanesthetic Checklist  Completed: patient identified, IV checked, site marked, risks and benefits discussed, surgical/procedural consents, equipment checked, pre-op evaluation, timeout performed, anesthesia consent given, oxygen available, monitors applied/VS acknowledged, fire risk safety assessment completed and verbalized and blood product R/B/A discussed and consented  Peripheral Block   Patient position: supine  Prep: ChloraPrep  Provider prep: mask, sterile gloves and sterile gown  Patient monitoring: cardiac monitor, continuous pulse ox, frequent blood pressure checks, IV access, oxygen and responsive to questions  Block type: Femoral  Adductor canal  Laterality: left  Injection technique: single-shot  Guidance: ultrasound guided    Needle   Needle type: insulated echogenic nerve stimulator needle   Needle gauge: 22 G  Needle localization: anatomical landmarks and ultrasound guidance  Assessment   Injection assessment: negative aspiration for heme, no paresthesia on injection, local visualized surrounding nerve on ultrasound and no intravascular symptoms  Paresthesia pain: none  Slow fractionated injection: yes  Hemodynamics: stable  Outcomes: uncomplicated and patient tolerated procedure well    Medications Administered  ropivacaine (NAROPIN) injection 0.5% - Perineural   20 mL - 4/24/2024 10:06:00 AM

## 2024-04-25 VITALS
HEART RATE: 79 BPM | DIASTOLIC BLOOD PRESSURE: 76 MMHG | BODY MASS INDEX: 41.91 KG/M2 | OXYGEN SATURATION: 100 % | HEIGHT: 62 IN | TEMPERATURE: 97.9 F | WEIGHT: 227.74 LBS | SYSTOLIC BLOOD PRESSURE: 147 MMHG | RESPIRATION RATE: 16 BRPM

## 2024-04-25 LAB
ANION GAP SERPL CALC-SCNC: 6 MMOL/L (ref 5–15)
BUN SERPL-MCNC: 17 MG/DL (ref 6–20)
BUN/CREAT SERPL: 19 (ref 12–20)
CALCIUM SERPL-MCNC: 9.9 MG/DL (ref 8.5–10.1)
CHLORIDE SERPL-SCNC: 106 MMOL/L (ref 97–108)
CO2 SERPL-SCNC: 26 MMOL/L (ref 21–32)
CREAT SERPL-MCNC: 0.88 MG/DL (ref 0.55–1.02)
GLUCOSE SERPL-MCNC: 178 MG/DL (ref 65–100)
HCT VFR BLD AUTO: 41.3 % (ref 35–47)
HGB BLD-MCNC: 13.6 G/DL (ref 11.5–16)
POTASSIUM SERPL-SCNC: 3.2 MMOL/L (ref 3.5–5.1)
SODIUM SERPL-SCNC: 138 MMOL/L (ref 136–145)

## 2024-04-25 PROCEDURE — 85014 HEMATOCRIT: CPT

## 2024-04-25 PROCEDURE — 97110 THERAPEUTIC EXERCISES: CPT

## 2024-04-25 PROCEDURE — 97116 GAIT TRAINING THERAPY: CPT

## 2024-04-25 PROCEDURE — 80048 BASIC METABOLIC PNL TOTAL CA: CPT

## 2024-04-25 PROCEDURE — 36415 COLL VENOUS BLD VENIPUNCTURE: CPT

## 2024-04-25 PROCEDURE — 6370000000 HC RX 637 (ALT 250 FOR IP): Performed by: ORTHOPAEDIC SURGERY

## 2024-04-25 PROCEDURE — 97530 THERAPEUTIC ACTIVITIES: CPT

## 2024-04-25 PROCEDURE — 96374 THER/PROPH/DIAG INJ IV PUSH: CPT

## 2024-04-25 PROCEDURE — 96375 TX/PRO/DX INJ NEW DRUG ADDON: CPT

## 2024-04-25 PROCEDURE — 85018 HEMOGLOBIN: CPT

## 2024-04-25 PROCEDURE — 6360000002 HC RX W HCPCS: Performed by: ORTHOPAEDIC SURGERY

## 2024-04-25 PROCEDURE — 96376 TX/PRO/DX INJ SAME DRUG ADON: CPT

## 2024-04-25 PROCEDURE — 2580000003 HC RX 258: Performed by: ORTHOPAEDIC SURGERY

## 2024-04-25 PROCEDURE — G0378 HOSPITAL OBSERVATION PER HR: HCPCS

## 2024-04-25 RX ORDER — OXYCODONE HYDROCHLORIDE 5 MG/1
5 TABLET ORAL EVERY 6 HOURS PRN
Qty: 25 TABLET | Refills: 0 | Status: SHIPPED | OUTPATIENT
Start: 2024-04-25 | End: 2024-05-02

## 2024-04-25 RX ADMIN — CEFAZOLIN 2000 MG: 1 INJECTION, POWDER, FOR SOLUTION INTRAMUSCULAR; INTRAVENOUS at 04:32

## 2024-04-25 RX ADMIN — ACETAMINOPHEN 1000 MG: 500 TABLET ORAL at 05:34

## 2024-04-25 RX ADMIN — SENNOSIDES AND DOCUSATE SODIUM 1 TABLET: 50; 8.6 TABLET ORAL at 09:13

## 2024-04-25 RX ADMIN — SODIUM CHLORIDE, PRESERVATIVE FREE 10 ML: 5 INJECTION INTRAVENOUS at 09:15

## 2024-04-25 RX ADMIN — OXYCODONE 5 MG: 5 TABLET ORAL at 14:54

## 2024-04-25 RX ADMIN — OXYCODONE 5 MG: 5 TABLET ORAL at 09:14

## 2024-04-25 RX ADMIN — KETOROLAC TROMETHAMINE 15 MG: 30 INJECTION, SOLUTION INTRAMUSCULAR at 14:55

## 2024-04-25 RX ADMIN — ACETAMINOPHEN 1000 MG: 500 TABLET ORAL at 14:53

## 2024-04-25 RX ADMIN — ONDANSETRON 4 MG: 2 INJECTION INTRAMUSCULAR; INTRAVENOUS at 10:30

## 2024-04-25 RX ADMIN — KETOROLAC TROMETHAMINE 15 MG: 30 INJECTION, SOLUTION INTRAMUSCULAR at 09:15

## 2024-04-25 RX ADMIN — FAMOTIDINE 20 MG: 20 TABLET, FILM COATED ORAL at 09:13

## 2024-04-25 RX ADMIN — TRANEXAMIC ACID 1950 MG: 650 TABLET, FILM COATED ORAL at 09:15

## 2024-04-25 RX ADMIN — POLYETHYLENE GLYCOL 3350 17 G: 17 POWDER, FOR SOLUTION ORAL at 09:15

## 2024-04-25 ASSESSMENT — PAIN SCALES - GENERAL
PAINLEVEL_OUTOF10: 2
PAINLEVEL_OUTOF10: 0
PAINLEVEL_OUTOF10: 6
PAINLEVEL_OUTOF10: 2
PAINLEVEL_OUTOF10: 0
PAINLEVEL_OUTOF10: 2

## 2024-04-25 ASSESSMENT — PAIN - FUNCTIONAL ASSESSMENT: PAIN_FUNCTIONAL_ASSESSMENT: PREVENTS OR INTERFERES SOME ACTIVE ACTIVITIES AND ADLS

## 2024-04-25 ASSESSMENT — PAIN DESCRIPTION - LOCATION
LOCATION: KNEE
LOCATION: KNEE

## 2024-04-25 ASSESSMENT — PAIN DESCRIPTION - DESCRIPTORS
DESCRIPTORS: ACHING
DESCRIPTORS: ACHING

## 2024-04-25 ASSESSMENT — PAIN DESCRIPTION - ORIENTATION
ORIENTATION: LEFT
ORIENTATION: LEFT

## 2024-04-25 NOTE — PROGRESS NOTES
Ortho / Neurosurgery Progress Note    POD# 1  s/p LEFT TOTAL KNEE ARTHROPLASTY WITH MAKOPLASTY   Pt seen with no visitors present. Pt is awake alert and oriented, reports she slept well. Tolerating diet and voiding. Denies fever or chills. Reports pain is better. Walked with PT yesterday for a few steps.  Patient in bed    VSS Afebrile.    Visit Vitals  /75   Pulse 71   Temp 97.3 °F (36.3 °C)   Resp 20   Ht 1.575 m (5' 2\")   Wt 103.3 kg (227 lb 11.8 oz)   SpO2 98%   BMI 41.65 kg/m²       Voiding status: Voiding independently.              Labs    No results found for: \"HGB\"   No results found for: \"INR\"   No results found for: \"NA\", \"K\", \"CL\", \"CO2\", \"GLU\", \"BUN\", \"CA\"  Recent Glucose Results: No results found for: \"GLUCOSE\"        Body mass index is 41.65 kg/m². : A BMI > 30 is classified as obesity and > 40 is classified as morbid obesity.     Awake and alert. No acute distress.    Dressing: Silver Dressing and Ace Wrap C.D.I.   No significant erythema or swelling  Cryotherapy in place over incision.   BLE sensation to light touch intact  BLE motor intact. Strength 5/5    SCD for mechanical DVT proph while in bed        PLAN:  1) PT BID - WBAT.   2) DVT Prophylaxis: Aspirin 81 mg BID for DVT Prophylaxis   3) GI Prophylaxis - PEPCID  4) Pain control - scheduled tylenol  and toradol, and prn  OXY  5) Readiness for discharge:     [x] Vital Signs stable    [x] Labs stable    [x] + Voiding    [x] Wound intact, drainage minimal    [x] Tolerating PO intake     [x] Cleared by PT (OT if applicable) for discharge   [x] Adequate pain control on oral medication alone       Discharge Plan: Discharge Plan: Home with Home Health     Discharge Needs: HH     Walker was delivered to bedside yesterday.      Pearl Kern PA-C, [facility] [place]    Orthopedic Physician Assistant

## 2024-04-25 NOTE — PROGRESS NOTES
DISCHARGE NOTE FROM ORTHO NURSE    Patient determined to be stable for discharge by attending provider. I have reviewed the discharge instructions with the patient and spouse. They verbalized understanding and all questions were answered to their satisfaction. No complaints or further questions were expressed.      Medications sent to pharmacy. Appropriate educational materials and medication side effect teaching were provided.      PIV were removed prior to discharge.     Patient did not discharge with any line, nolan, or drain.    Personal items and valuables accounted for at discharge by patient and/or family: Yes    Post-op patient: Yes-Patient given post-op discharge kit and instructed on use.    Ld Roper RN

## 2024-04-25 NOTE — PLAN OF CARE
Problem: Physical Therapy - Adult  Goal: By Discharge: Performs mobility at highest level of function for planned discharge setting.  See evaluation for individualized goals.  Description: FUNCTIONAL STATUS PRIOR TO ADMISSION: Patient was modified independent using a single point cane for functional mobility.    HOME SUPPORT PRIOR TO ADMISSION: The patient lived with  but did not require assistance. Sister will also be staying with patient upon discharge     Physical Therapy Goals  Initiated 4/24/2024  1.  Patient will move from supine to sit and sit to supine, scoot up and down, and roll side to side in bed with modified independence within 4 day(s).    2.  Patient will perform sit to stand with modified independence within 4 day(s).  3.  Patient will transfer from bed to chair and chair to bed with modified independence using the least restrictive device within 4 day(s).  4.  Patient will ambulate with modified independence for 100 feet with the least restrictive device within 4 day(s).   5.  Patient will ascend/descend 3 stairs without handrail(s) with supervision/set-up within 4 day(s).  6. Patient will perform home exercise program per protocol with modified independence within 4 days.  7. Patient will demonstrate AROM 0-90 degrees in operative joint within 4 days.     Outcome: Progressing     PHYSICAL THERAPY EVALUATION    Patient: Nithya Sanchez (64 y.o. female)  Date: 4/24/2024  Primary Diagnosis: Primary osteoarthritis of left knee [M17.12]  Procedure(s) (LRB):  LEFT TOTAL KNEE ARTHROPLASTY WITH MAKOPLASTY (Left) Day of Surgery   Precautions:                        ASSESSMENT :   Pt seen for PT evaluation POD 0, reports min L knee pain but limited by decreased strength and reporting LLE \"heaviness.\" Pt transferred supine > sit with Min A for LLE management, scooted anteriorly with CGA. Sit > stand from EOB with RW for UE support, Min A, slight drop in SBP (12 mmHg) but asymptomatic. Pt took 
  Problem: Safety - Adult  Goal: Free from fall injury  4/25/2024 0026 by Kenneth Wells RN  Outcome: Progressing  4/24/2024 1614 by Shereen Wolff LPN  Outcome: Progressing  Flowsheets (Taken 4/24/2024 1533)  Free From Fall Injury: Instruct family/caregiver on patient safety     Problem: Pain  Goal: Verbalizes/displays adequate comfort level or baseline comfort level  4/25/2024 0026 by Kenneth Wells RN  Outcome: Progressing  4/24/2024 1614 by Shereen Wolff LPN  Outcome: Progressing     Problem: Physical Therapy - Adult  Goal: By Discharge: Performs mobility at highest level of function for planned discharge setting.  See evaluation for individualized goals.  Description: FUNCTIONAL STATUS PRIOR TO ADMISSION: Patient was modified independent using a single point cane for functional mobility.    HOME SUPPORT PRIOR TO ADMISSION: The patient lived with  but did not require assistance. Sister will also be staying with patient upon discharge     Physical Therapy Goals  Initiated 4/24/2024  1.  Patient will move from supine to sit and sit to supine, scoot up and down, and roll side to side in bed with modified independence within 4 day(s).    2.  Patient will perform sit to stand with modified independence within 4 day(s).  3.  Patient will transfer from bed to chair and chair to bed with modified independence using the least restrictive device within 4 day(s).  4.  Patient will ambulate with modified independence for 100 feet with the least restrictive device within 4 day(s).   5.  Patient will ascend/descend 3 stairs without handrail(s) with supervision/set-up within 4 day(s).  6. Patient will perform home exercise program per protocol with modified independence within 4 days.  7. Patient will demonstrate AROM 0-90 degrees in operative joint within 4 days.     4/24/2024 1638 by Stella Overton, PT  Outcome: Progressing     
  Problem: Safety - Adult  Goal: Free from fall injury  4/25/2024 1420 by Ld Roper RN  Outcome: Progressing  4/25/2024 0026 by Kenneth Wells RN  Outcome: Progressing     Problem: Pain  Goal: Verbalizes/displays adequate comfort level or baseline comfort level  4/25/2024 1420 by Ld Roper RN  Outcome: Progressing  4/25/2024 0026 by Kenneth Wells RN  Outcome: Progressing     Problem: Physical Therapy - Adult  Goal: By Discharge: Performs mobility at highest level of function for planned discharge setting.  See evaluation for individualized goals.  Description: FUNCTIONAL STATUS PRIOR TO ADMISSION: Patient was modified independent using a single point cane for functional mobility.    HOME SUPPORT PRIOR TO ADMISSION: The patient lived with  but did not require assistance. Sister will also be staying with patient upon discharge     Physical Therapy Goals  Initiated 4/24/2024  1.  Patient will move from supine to sit and sit to supine, scoot up and down, and roll side to side in bed with modified independence within 4 day(s).    2.  Patient will perform sit to stand with modified independence within 4 day(s).  3.  Patient will transfer from bed to chair and chair to bed with modified independence using the least restrictive device within 4 day(s).  4.  Patient will ambulate with modified independence for 100 feet with the least restrictive device within 4 day(s).   5.  Patient will ascend/descend 3 stairs without handrail(s) with supervision/set-up within 4 day(s).  6. Patient will perform home exercise program per protocol with modified independence within 4 days.  7. Patient will demonstrate AROM 0-90 degrees in operative joint within 4 days.     4/25/2024 1234 by Roque Medrano, PT  Outcome: Progressing     
  Problem: Safety - Adult  Goal: Free from fall injury  Outcome: Progressing  Flowsheets (Taken 4/24/2024 9812)  Free From Fall Injury: Instruct family/caregiver on patient safety     Problem: Pain  Goal: Verbalizes/displays adequate comfort level or baseline comfort level  Outcome: Progressing     
Behavior:  Orientation  Overall Orientation Status: Within Normal Limits  Orientation Level: Oriented X4  Cognition  Overall Cognitive Status: WNL    Functional Mobility Training:  Bed Mobility:  Bed Mobility Training  Bed Mobility Training: Yes  Supine to Sit: Minimum assistance (Assist with lowering leg to floor)  Scooting: Minimum assistance (Mgmt of L leg)  Transfers:  Transfer Training  Transfer Training: Yes  Interventions: Verbal cues;Safety awareness training  Sit to Stand: Contact-guard assistance  Stand to Sit: Contact-guard assistance  Toilet Transfer: Contact-guard assistance  Balance:  Balance  Sitting: Intact  Standing: Impaired  Standing - Static: Good;Constant support  Standing - Dynamic: Constant support;Fair   Ambulation/Gait Training:     Gait  Gait Training: Yes  Left Side Weight Bearing: As tolerated  Overall Level of Assistance: Contact-guard assistance  Distance (ft): 150 Feet  Assistive Device: Gait belt;Walker, rolling  Interventions: Verbal cues;Safety awareness training;Demonstration  Base of Support: Shift to right  Speed/Maureen: Slow  Step Length: Left shortened;Right shortened  Stance: Left decreased  Gait Abnormalities: Antalgic;Decreased step clearance;Step to gait                                                                                                                                                                                                                                  Intervention/Education specific to: \"knee replacement\"    Encouraged use of cryo therapy to aide in pain and edema control.              Therapeutic Exercises:     EXERCISE   Sets   Reps   Active Active Assist   Passive Self ROM   Comments   Ankle Pumps   [x]                                        []                                        []                                        []                                           Quad Sets   [x]                                        []                       
Verbalized understanding;Demonstrated understanding      Roque Medrano, MICHELLE  Minutes: 35

## 2024-04-25 NOTE — CARE COORDINATION
CM met with pt and support person in room, pt finishing therapy session. Discussed recommendation for home health PT, pt stated she is agreeable to this and has no hx of HH services. No preference on agency, [facility] educated on Medicare star rating system, pt agreeable to CM sending multiple referrals to see which of the highest-rated agencies will serve pt's zip code and has the soonest start date, this will be pt's preferred agency. CM sending referrals.    Maricel Bahena, [facility] [place]  Care Management  [phone]

## 2024-04-25 NOTE — DISCHARGE SUMMARY
Ortho Discharge Summary    Patient ID:  Nithya Sanchez  [id number]  female  64 y.o.  1960    Admit date: 4/24/2024    Discharge date: 4/25/2024    Admitting Physician: Edgar Pope MD     Consulting Physician(s):   Treatment Team: Attending Provider: Edgar Pope MD; Surgeon: Edgar Pope MD; Utilization Reviewer: Nita Corona RN; : Maricel Bahena; Registered Nurse: Ld Roper RN; Physical Therapist: Roque Medrano PT; Physician Assistant: Pearl Kern PA-C    Date of Surgery:   4/24/2024     Preoperative Diagnosis:  Primary osteoarthritis of left knee [M17.12]    Postoperative Diagnosis:   * No post-op diagnosis entered *    Procedure(s):   LEFT TOTAL KNEE ARTHROPLASTY WITH MAKOPLASTY     Anesthesia Type:   Spinal     Surgeon: Edgar Pope MD                            HPI:  Pt is a 64 y.o. female who has a history of Primary osteoarthritis of left knee [M17.12]  with pain and limitations of activities of daily living who presents at this time for a left LEFT TOTAL KNEE ARTHROPLASTY WITH MAKOPLASTY following the failure of conservative management.    PMH:   Past Medical History:   Diagnosis Date    Acid reflux     Arthritis     knees, hips    Hiatal hernia     High cholesterol     Hypertension     Lymphedema     bilateral LE    Menopause     Sleep apnea     CPAP       Body mass index is 41.65 kg/m². : A BMI > 30 is classified as obesity and > 40 is classified as morbid obesity.     Medications upon admission :   Prior to Admission Medications   Prescriptions Last Dose Informant Patient Reported? Taking?   Cholecalciferol 50 MCG (2000 UT) CAPS Past Week  Yes No   Sig: Take 1 capsule by mouth daily   Famotidine-Ca Carb-Mag Hydrox (PEPCID COMPLETE PO) Past Week  Yes No   Sig: Take by mouth as needed   Multiple Vitamin (MULTI-VITAMIN DAILY PO) Past Week  Yes No   Sig: Take by mouth daily   Omega-3 Fatty Acids (FISH OIL) 1200 MG CAPS Past Week  Yes No   Sig: Take by mouth daily

## 2024-04-25 NOTE — PROGRESS NOTES
End of Shift Note    Bedside shift change report given to Ld MURCIA (oncoming nurse) by Kenneth Wells RN (offgoing nurse).  Report included the following information SBAR, Kardex, Procedure Summary, Intake/Output, MAR, and Recent Results    Shift worked:  Nights       Shift summary and any significant changes:     none     Concerns for physician to address:  none     Zone phone for oncoming shift:   [phone]         Activity:     Number times ambulated in hallways past shift: 0  Number of times OOB to chair past shift: 0    Cardiac:   Cardiac Monitoring: No           Access:  Current line(s): PIV     Genitourinary:   Urinary status: voiding and external catheter    Respiratory:      Chronic home O2 use?: NO  Incentive spirometer at bedside: YES       GI:     Current diet:  ADULT DIET; Regular  Passing flatus: YES  Tolerating current diet: YES       Pain Management:   Patient states pain is manageable on current regimen: YES    Skin:     Interventions: float heels and increase time out of bed    Patient Safety:  Fall Score:    Interventions: bed/chair alarm       Length of Stay:  Expected LOS: 1  Actual LOS: 0      Kenneth Wells RN

## 2024-06-20 ENCOUNTER — OFFICE VISIT (OUTPATIENT)
Age: 64
End: 2024-06-20
Payer: MEDICARE

## 2024-06-20 ENCOUNTER — CLINICAL DOCUMENTATION (OUTPATIENT)
Age: 64
End: 2024-06-20

## 2024-06-20 VITALS
HEIGHT: 62 IN | WEIGHT: 235.7 LBS | SYSTOLIC BLOOD PRESSURE: 105 MMHG | DIASTOLIC BLOOD PRESSURE: 62 MMHG | TEMPERATURE: 98 F | OXYGEN SATURATION: 93 % | BODY MASS INDEX: 43.37 KG/M2 | HEART RATE: 78 BPM

## 2024-06-20 DIAGNOSIS — G47.00 INSOMNIA, UNSPECIFIED TYPE: ICD-10-CM

## 2024-06-20 DIAGNOSIS — I10 PRIMARY HYPERTENSION: ICD-10-CM

## 2024-06-20 DIAGNOSIS — G47.33 OBSTRUCTIVE SLEEP APNEA (ADULT) (PEDIATRIC): Primary | ICD-10-CM

## 2024-06-20 PROCEDURE — 1090F PRES/ABSN URINE INCON ASSESS: CPT | Performed by: NURSE PRACTITIONER

## 2024-06-20 PROCEDURE — 3078F DIAST BP <80 MM HG: CPT | Performed by: NURSE PRACTITIONER

## 2024-06-20 PROCEDURE — G8427 DOCREV CUR MEDS BY ELIG CLIN: HCPCS | Performed by: NURSE PRACTITIONER

## 2024-06-20 PROCEDURE — G8417 CALC BMI ABV UP PARAM F/U: HCPCS | Performed by: NURSE PRACTITIONER

## 2024-06-20 PROCEDURE — G8400 PT W/DXA NO RESULTS DOC: HCPCS | Performed by: NURSE PRACTITIONER

## 2024-06-20 PROCEDURE — 99213 OFFICE O/P EST LOW 20 MIN: CPT | Performed by: NURSE PRACTITIONER

## 2024-06-20 PROCEDURE — 3074F SYST BP LT 130 MM HG: CPT | Performed by: NURSE PRACTITIONER

## 2024-06-20 PROCEDURE — 3017F COLORECTAL CA SCREEN DOC REV: CPT | Performed by: NURSE PRACTITIONER

## 2024-06-20 PROCEDURE — 1036F TOBACCO NON-USER: CPT | Performed by: NURSE PRACTITIONER

## 2024-06-20 PROCEDURE — 1123F ACP DISCUSS/DSCN MKR DOCD: CPT | Performed by: NURSE PRACTITIONER

## 2024-06-20 ASSESSMENT — SLEEP AND FATIGUE QUESTIONNAIRES
HOW LIKELY ARE YOU TO NOD OFF OR FALL ASLEEP WHILE SITTING QUIETLY AFTER LUNCH WITHOUT ALCOHOL: SLIGHT CHANCE OF DOZING
HOW LIKELY ARE YOU TO NOD OFF OR FALL ASLEEP WHILE WATCHING TV: MODERATE CHANCE OF DOZING
HOW LIKELY ARE YOU TO NOD OFF OR FALL ASLEEP WHILE LYING DOWN TO REST IN THE AFTERNOON WHEN CIRCUMSTANCES PERMIT: MODERATE CHANCE OF DOZING
ESS TOTAL SCORE: 12
HOW LIKELY ARE YOU TO NOD OFF OR FALL ASLEEP IN A CAR, WHILE STOPPED FOR A FEW MINUTES IN TRAFFIC: WOULD NEVER DOZE
HOW LIKELY ARE YOU TO NOD OFF OR FALL ASLEEP WHILE SITTING AND TALKING TO SOMEONE: SLIGHT CHANCE OF DOZING
HOW LIKELY ARE YOU TO NOD OFF OR FALL ASLEEP WHEN YOU ARE A PASSENGER IN A CAR FOR AN HOUR WITHOUT A BREAK: HIGH CHANCE OF DOZING
HOW LIKELY ARE YOU TO NOD OFF OR FALL ASLEEP WHILE SITTING INACTIVE IN A PUBLIC PLACE: MODERATE CHANCE OF DOZING
HOW LIKELY ARE YOU TO NOD OFF OR FALL ASLEEP WHILE SITTING AND READING: SLIGHT CHANCE OF DOZING

## 2024-06-20 NOTE — PATIENT INSTRUCTIONS
[address]  [place] [address]  Tel.  [phone]  Fax. [phone] [address]  [place] [address]  Tel.  [phone]  Fax. [phone] [address] [place] [address]  Tel.  [phone]  Fax. [phone]       Insomnia: After Your Visit  Your Care Instructions  Insomnia is the inability to sleep well. It is a common problem for most people at some time. Insomnia may make it hard for you to get to sleep, stay asleep, or sleep as long as you need to. This can make you tired and grouchy during the day. It can also make you forgetful, less effective at work, and unhappy.  Insomnia can be caused by conditions such as depression or anxiety. Pain can also affect your ability to sleep. When these problems are solved, the insomnia usually clears up. But sometimes bad sleep habits can cause insomnia.  If insomnia is affecting your work or your enjoyment of life, you can take steps to improve your sleep.  Follow-up care is a key part of your treatment and safety. Be sure to make and go to all appointments, and call your doctor if you are having problems. It's also a good idea to know your test results and keep a list of the medicines you take.  How can you care for yourself at home?  What to avoid  Do not have drinks with caffeine, such as coffee or black tea, for 8 hours before bed.  Do not smoke or use other types of tobacco near bedtime. Nicotine is a stimulant and can keep you awake.  Avoid drinking alcohol late in the evening, because it can cause you to wake in the middle of the night.  Do not eat a big meal close to bedtime. If you are hungry, eat a light snack.  Do not drink a lot of water close to bedtime, because the need to urinate may wake you up during the night.  Do not read or watch TV in bed. Use the bed only for sleeping and sexual activity.  What to try  Go to bed at the same time every night, and wake up at the same time every morning. Do

## 2024-06-20 NOTE — PROGRESS NOTES
[address]   [place] [address]   Tel.  [phone]   Fax. [phone]  [address]   [place] [address]   Tel.  [phone]   Fax. [phone] [address] [place] [address]   Tel.  [phone]   Fax. [phone]     Nithya Sanchez (: 1960) is a 63 y.o. female, established patient, seen for positive airway pressure follow-up and evaluation.  She was last seen by me on 2024, previously seen by Dr. Hester on 2023, prior notes reviewed in detail.  Home sleep test 10/2023 showed AHI of 11/hr with a lowest SaO2 of 85%.     ASSESSMENT/PLAN:   Diagnosis Orders   1. Obstructive sleep apnea (adult) (pediatric)  DME Order for (Specify) as OP      2. Primary hypertension        3. Insomnia, unspecified type        4. BMI 40.0-44.9, adult (HCC)          AHI = 11 ().  On CPAP :  5-10 cmH2O. Set up 2023.     She is adherent with PAP therapy and PAP continues to benefit patient and remains necessary for control of her sleep apnea.     No follow-up provider specified.    Sleep Apnea - Continue on current pressures.     Orders Placed This Encounter   Procedures    DME Order for (Specify) as OP     Diagnosis: (G47.33) DMITRIY (obstructive sleep apnea)  (primary encounter diagnosis)     Replacement Supplies for Positive Airway Pressure Therapy Device:   Duration of need: 99 months.      Nasal Pillows Combo Mask (Replace) 2 per month.   Nasal Pillows (Replace) 2 per month.    Nasal Cushion (Replace) 2 per month.   Nasal Interface Mask 1 every 3 months.         Headgear 1 every 6 months.   Positive Airway Pressure chinstrap 1 every 6 months.     Tubing with heating element 1 every 3 months.     Filter(s) Disposable 2 per month.   Filter(s) Non-Disposable 1 every 6 months.   .    Water Chamber for Humidifier (Replace) 1 every 6 months.    MAUREEN FairBC NPI: [id number]    Electronically

## 2024-10-01 ENCOUNTER — HOSPITAL ENCOUNTER (OUTPATIENT)
Facility: HOSPITAL | Age: 64
Setting detail: RECURRING SERIES
Discharge: HOME | End: 2024-10-04
Payer: MEDICARE

## 2024-10-01 VITALS — HEIGHT: 62 IN | BODY MASS INDEX: 42.03 KG/M2 | WEIGHT: 228.4 LBS

## 2024-10-01 PROCEDURE — 97760 ORTHOTIC MGMT&TRAING 1ST ENC: CPT

## 2024-10-01 PROCEDURE — 97535 SELF CARE MNGMENT TRAINING: CPT

## 2024-10-01 PROCEDURE — 97161 PT EVAL LOW COMPLEX 20 MIN: CPT

## 2024-10-01 NOTE — THERAPY EVALUATION
[facility]  a part of [facility]   [address]  [place] [address]  Phone: [phone]    Fax: [phone]                                                                                PT/OT LYMPHEDEMA - EVALUATION/PLAN OF CARE NOTE (updated 3/23)      Date: 10/1/2024          Patient Name:  Nithya Sanchez :  1960   Medical   Diagnosis:  Hereditary lymphedema [Q82.0] Treatment Diagnosis:  Q82.0     Hereditary lymphedema    Referral Source:  Alessandra Zaman MD Provider #:  [id number]                Insurance: Payor: MEDICARE / Plan: MEDICARE PART A AND B / Product Type: *No Product type* /      Patient  verified yes     Visit #   Current  / Total 1 1     Time   In / Out 1208 1412   Total Treatment Time 124   Total Timed Codes 58   1:1 Treatment Time 58      [facility] Totals Reminder:  bill using total billable   min of TIMED therapeutic procedures and modalities.   8-22 min = 1 unit; 23-37 min = 2 units; 38-52 min = 3 units;  53-67 min = 4 units; 68-82 min = 5 units         SUBJECTIVE  Pain Level (0-10 scale):  0 out of 10 numeric scale currently but she notes increased stiffness if she is not being active and exercising   []constant [x]intermittent []improving []worsening [x]no change since onset    Any medication changes, allergies to medications, adverse drug reactions, diagnosis change, or new procedure performed?: [] No    [x] Yes (see summary sheet for update)  TKR left 2024    Medications: Verified on Patient Summary List    Subjective functional status/changes:   It took me awhile to recover from the surgery.  I had slacked off my exercises and started using the cane again so I had to get back into everything again so I got back off of the cane.    Start of Care: 10/1/2024  Onset Date: :Referred 3/2017. Patient reports swelling has been present for years since she was young, but she was first diagnosed 2015.   Current

## 2025-06-19 ENCOUNTER — OFFICE VISIT (OUTPATIENT)
Age: 65
End: 2025-06-19
Payer: MEDICARE

## 2025-06-19 ENCOUNTER — CLINICAL DOCUMENTATION (OUTPATIENT)
Age: 65
End: 2025-06-19

## 2025-06-19 VITALS
SYSTOLIC BLOOD PRESSURE: 123 MMHG | HEART RATE: 68 BPM | DIASTOLIC BLOOD PRESSURE: 66 MMHG | WEIGHT: 247 LBS | BODY MASS INDEX: 45.45 KG/M2 | TEMPERATURE: 98.4 F | HEIGHT: 62 IN | OXYGEN SATURATION: 95 %

## 2025-06-19 DIAGNOSIS — I10 PRIMARY HYPERTENSION: ICD-10-CM

## 2025-06-19 DIAGNOSIS — G47.00 INSOMNIA, UNSPECIFIED TYPE: ICD-10-CM

## 2025-06-19 DIAGNOSIS — G47.33 OBSTRUCTIVE SLEEP APNEA (ADULT) (PEDIATRIC): Primary | ICD-10-CM

## 2025-06-19 PROCEDURE — 99214 OFFICE O/P EST MOD 30 MIN: CPT | Performed by: NURSE PRACTITIONER

## 2025-06-19 ASSESSMENT — SLEEP AND FATIGUE QUESTIONNAIRES
HOW LIKELY ARE YOU TO NOD OFF OR FALL ASLEEP WHILE WATCHING TV: MODERATE CHANCE OF DOZING
HAS YOUR MOOD BEEN AFFECTED BECAUSE YOU ARE SLEEPY OR TIRED: NO
DO YOU HAVE DIFFICULTY CONCENTRATING ON THE THINGS YOU DO BECAUSE YOU ARE SLEEPY OR TIRED: NO
DO YOU HAVE DIFFICULTY BEING AS ACTIVE AS YOU WANT TO BE IN THE EVENING BECAUSE YOU ARE SLEEPY OR TIRED: NO
DO YOU HAVE DIFFICULTY OPERATING A MOTOR VEHICLE FOR SHORT DISTANCES (LESS THAN 100 MILES) BECAUSE YOU BECOME SLEEPY: NO
HOW LIKELY ARE YOU TO NOD OFF OR FALL ASLEEP WHEN YOU ARE A PASSENGER IN A CAR FOR AN HOUR WITHOUT A BREAK: MODERATE CHANCE OF DOZING
DO YOU HAVE DIFFICULTY VISITING YOUR FAMILY OR FRIENDS IN THEIR HOME BECAUSE YOU BECOME SLEEPY OR TIRED: NO
HOW LIKELY ARE YOU TO NOD OFF OR FALL ASLEEP WHILE SITTING INACTIVE IN A PUBLIC PLACE: WOULD NEVER DOZE
HOW LIKELY ARE YOU TO NOD OFF OR FALL ASLEEP WHILE SITTING AND TALKING TO SOMEONE: WOULD NEVER DOZE
HOW LIKELY ARE YOU TO NOD OFF OR FALL ASLEEP WHILE SITTING QUIETLY AFTER LUNCH WITHOUT ALCOHOL: SLIGHT CHANCE OF DOZING
HOW LIKELY ARE YOU TO NOD OFF OR FALL ASLEEP IN A CAR, WHILE STOPPED FOR A FEW MINUTES IN TRAFFIC: WOULD NEVER DOZE
HOW LIKELY ARE YOU TO NOD OFF OR FALL ASLEEP WHILE SITTING QUIETLY AFTER LUNCH WITHOUT ALCOHOL: SLIGHT CHANCE OF DOZING
DO YOU GENERALLY HAVE DIFFICULTY REMEMBERING THINGS BECAUSE YOU ARE SLEEPY OR TIRED: NO
HOW LIKELY ARE YOU TO NOD OFF OR FALL ASLEEP WHILE LYING DOWN TO REST IN THE AFTERNOON WHEN CIRCUMSTANCES PERMIT: MODERATE CHANCE OF DOZING
HOW LIKELY ARE YOU TO NOD OFF OR FALL ASLEEP WHILE SITTING AND TALKING TO SOMEONE: WOULD NEVER DOZE
HOW LIKELY ARE YOU TO NOD OFF OR FALL ASLEEP WHEN YOU ARE A PASSENGER IN A CAR FOR AN HOUR WITHOUT A BREAK: MODERATE CHANCE OF DOZING
DO YOU HAVE DIFFICULTY WATCHING A MOVIE OR VIDEO BECAUSE YOU BECOME SLEEPY OR TIRED: YES, A LITTLE
FOSQ SCORE: 19
HAS YOUR RELATIONSHIP WITH FAMILY, FRIENDS OR WORK COLLEAGUES BEEN AFFECTED BECAUSE YOU ARE SLEEPY OR TIRED: NO
HOW LIKELY ARE YOU TO NOD OFF OR FALL ASLEEP WHILE SITTING AND READING: MODERATE CHANCE OF DOZING
DO YOU HAVE DIFFICULTY BEING AS ACTIVE AS YOU WANT TO BE IN THE MORNING BECAUSE YOU ARE SLEEPY OR TIRED: YES, LITTLE
DO YOU HAVE DIFFICULTY OPERATING A MOTOR VEHICLE FOR LONG DISTANCES (GREATER THAN 100 MILES) BECAUSE YOU BECOME SLEEPY: NO
HOW LIKELY ARE YOU TO NOD OFF OR FALL ASLEEP WHILE SITTING INACTIVE IN A PUBLIC PLACE: WOULD NEVER DOZE
HOW LIKELY ARE YOU TO NOD OFF OR FALL ASLEEP IN A CAR, WHILE STOPPED FOR A FEW MINUTES IN TRAFFIC: WOULD NEVER DOZE
HOW LIKELY ARE YOU TO NOD OFF OR FALL ASLEEP WHILE LYING DOWN TO REST IN THE AFTERNOON WHEN CIRCUMSTANCES PERMIT: MODERATE CHANCE OF DOZING
HOW LIKELY ARE YOU TO NOD OFF OR FALL ASLEEP WHILE SITTING AND READING: MODERATE CHANCE OF DOZING
ESS TOTAL SCORE: 9
HOW LIKELY ARE YOU TO NOD OFF OR FALL ASLEEP WHILE WATCHING TV: MODERATE CHANCE OF DOZING

## 2025-06-19 NOTE — PROGRESS NOTES
talking to someone 0 1 1 1   Sitting quietly after a lunch without alcohol 1 1 2 2   In a car, while stopped for a few minutes in traffic 0 0 0 1   Bryan Sleepiness Score 9  12 11 13   Neck (Inches)    15       Patient-reported     Sleep Review of Systems: notable for Negative difficulty falling asleep; positive awakenings at night; Negative early morning headaches; Negative memory problems; Negative concentration issues; Negative chest pain; Negative shortness of breath; Negative significant joint pain at night; Negative significant muscle pain at night; Negative rashes or itching; Negative heartburn; Negative significant mood issues;     Vitals:    06/19/25 1341   BP: 123/66   Pulse: 68   Temp: 98.4 °F (36.9 °C)   SpO2: 95%     General:   Alert, oriented, not in acute distress   Eyes:  Anicteric Sclerae; no obvious strabismus   Nose:  No obvious nasal septum deviation    Neck:   Midline trachea   Chest/Lungs:  Symmetrical lung expansion, clear lung fields on auscultation    CVS:  Normal rate, regular rhythm,  no JVD   Extremities:  No obvious rashes, no edema    Neuro:  No focal deficits; No obvious tremor    Psych:  Normal affect,  normal countenance     Patient's phone number [phone] (home)  was reviewed and confirmed for accuracy.  She gives permission for messages regarding results and appointments to be left at that number.    An electronic signature was used to authenticate this note.    -- LYDIA Martinez NP, [facility]  06/19/25

## 2025-06-19 NOTE — PATIENT INSTRUCTIONS
[address]  [place] [address]  Tel.  [phone]  Fax. [phone] [address]  [place] [address]  Tel.  [phone]  Fax. [phone] [address] [place] [address]  Tel.  [phone]  Fax. [phone]     Learning About CPAP for Sleep Apnea  What is CPAP?              CPAP is a small machine that you use at home every night while you sleep. It increases air pressure in your throat to keep your airway open. When you have sleep apnea, this can help you sleep better so you feel much better. CPAP stands for \"continuous positive airway pressure.\"  The CPAP machine will have one of the following:  A mask that covers your nose and mouth  Prongs that fit into your nose  A mask that covers your nose only, the most common type. This type is called NCPAP. The N stands for \"nasal.\"  Why is it done?  CPAP is usually the best treatment for obstructive sleep apnea. It is the first treatment choice and the most widely used. Your doctor may suggest CPAP if you have:  Moderate to severe sleep apnea.  Sleep apnea and coronary artery disease (CAD) or heart failure.  How does it help?  CPAP can help you have more normal sleep, so you feel less sleepy and more alert during the daytime.  CPAP may help keep heart failure or other heart problems from getting worse.  NCPAP may help lower your blood pressure.  If you use CPAP, your bed partner may also sleep better because you are not snoring or restless.  What are the side effects?  Some people who use CPAP have:  A dry or stuffy nose and a sore throat.  Irritated skin on the face.  Sore eyes.  Bloating.  If you have any of these problems, work with your doctor to fix them. Here are some things you can try:  Be sure the mask or nasal prongs fit well.  See if your doctor can adjust the pressure of your CPAP.  If your nose is dry, try a humidifier.  If your nose is runny or stuffy, try decongestant medicine or a steroid

## 2025-08-08 ENCOUNTER — TRANSCRIBE ORDERS (OUTPATIENT)
Facility: HOSPITAL | Age: 65
End: 2025-08-08

## 2025-08-08 DIAGNOSIS — I89.0 LYMPHEDEMA: Primary | ICD-10-CM

## 2025-08-11 ENCOUNTER — HOSPITAL ENCOUNTER (OUTPATIENT)
Facility: HOSPITAL | Age: 65
Setting detail: RECURRING SERIES
Discharge: HOME | End: 2025-08-14
Payer: MEDICARE

## 2025-08-11 VITALS — HEIGHT: 62 IN | BODY MASS INDEX: 45.82 KG/M2 | WEIGHT: 249 LBS

## 2025-08-11 PROCEDURE — 97161 PT EVAL LOW COMPLEX 20 MIN: CPT

## 2025-08-11 PROCEDURE — 97760 ORTHOTIC MGMT&TRAING 1ST ENC: CPT

## 2025-08-11 PROCEDURE — 97535 SELF CARE MNGMENT TRAINING: CPT
